# Patient Record
Sex: FEMALE | Race: WHITE | NOT HISPANIC OR LATINO
[De-identification: names, ages, dates, MRNs, and addresses within clinical notes are randomized per-mention and may not be internally consistent; named-entity substitution may affect disease eponyms.]

---

## 2017-01-05 ENCOUNTER — APPOINTMENT (OUTPATIENT)
Dept: ORTHOPEDIC SURGERY | Facility: CLINIC | Age: 32
End: 2017-01-05

## 2017-01-09 ENCOUNTER — APPOINTMENT (OUTPATIENT)
Dept: DERMATOLOGY | Facility: CLINIC | Age: 32
End: 2017-01-09

## 2017-01-09 ENCOUNTER — APPOINTMENT (OUTPATIENT)
Dept: ENDOCRINOLOGY | Facility: CLINIC | Age: 32
End: 2017-01-09

## 2017-01-09 VITALS
DIASTOLIC BLOOD PRESSURE: 78 MMHG | SYSTOLIC BLOOD PRESSURE: 128 MMHG | BODY MASS INDEX: 41.02 KG/M2 | HEIGHT: 71 IN | WEIGHT: 293 LBS

## 2017-01-09 DIAGNOSIS — L21.9 SEBORRHEIC DERMATITIS, UNSPECIFIED: ICD-10-CM

## 2017-01-09 DIAGNOSIS — F17.200 NICOTINE DEPENDENCE, UNSPECIFIED, UNCOMPLICATED: ICD-10-CM

## 2017-01-09 DIAGNOSIS — Z87.898 PERSONAL HISTORY OF OTHER SPECIFIED CONDITIONS: ICD-10-CM

## 2017-01-09 DIAGNOSIS — L81.0 POSTINFLAMMATORY HYPERPIGMENTATION: ICD-10-CM

## 2017-01-09 RX ORDER — KETOCONAZOLE 20.5 MG/ML
2 SHAMPOO, SUSPENSION TOPICAL
Qty: 1 | Refills: 11 | Status: ACTIVE | COMMUNITY
Start: 2017-01-09 | End: 1900-01-01

## 2017-01-10 RX ORDER — FLUOCINOLONE ACETONIDE 0.11 MG/ML
0.01 OIL TOPICAL
Qty: 1 | Refills: 1 | Status: ACTIVE | COMMUNITY
Start: 2017-01-09 | End: 1900-01-01

## 2017-04-10 ENCOUNTER — RESULT REVIEW (OUTPATIENT)
Age: 32
End: 2017-04-10

## 2017-05-09 ENCOUNTER — APPOINTMENT (OUTPATIENT)
Dept: OTOLARYNGOLOGY | Facility: CLINIC | Age: 32
End: 2017-05-09

## 2017-05-09 VITALS
WEIGHT: 293 LBS | SYSTOLIC BLOOD PRESSURE: 146 MMHG | HEART RATE: 104 BPM | DIASTOLIC BLOOD PRESSURE: 102 MMHG | HEIGHT: 71 IN | BODY MASS INDEX: 41.02 KG/M2

## 2017-05-09 DIAGNOSIS — J34.3 HYPERTROPHY OF NASAL TURBINATES: ICD-10-CM

## 2017-05-09 RX ORDER — NORGESTIMATE AND ETHINYL ESTRADIOL 7DAYSX3 LO
0.18/0.215/0.25 KIT ORAL
Qty: 28 | Refills: 0 | Status: ACTIVE | COMMUNITY
Start: 2017-03-30

## 2017-05-09 RX ORDER — CLARITHROMYCIN 500 MG/1
500 TABLET, FILM COATED ORAL
Qty: 20 | Refills: 0 | Status: ACTIVE | COMMUNITY
Start: 2017-01-09

## 2017-05-09 RX ORDER — FLUTICASONE PROPIONATE 50 UG/1
50 SPRAY, METERED NASAL DAILY
Qty: 1 | Refills: 3 | Status: ACTIVE | COMMUNITY
Start: 2017-05-09 | End: 1900-01-01

## 2017-05-09 RX ORDER — METHYLPREDNISOLONE 4 MG/1
4 TABLET ORAL
Qty: 21 | Refills: 0 | Status: ACTIVE | COMMUNITY
Start: 2017-01-09

## 2017-05-09 RX ORDER — CIPROFLOXACIN AND DEXAMETHASONE 3; 1 MG/ML; MG/ML
0.3-0.1 SUSPENSION/ DROPS AURICULAR (OTIC)
Qty: 8 | Refills: 0 | Status: ACTIVE | COMMUNITY
Start: 2017-03-02

## 2017-05-30 ENCOUNTER — APPOINTMENT (OUTPATIENT)
Dept: OTOLARYNGOLOGY | Facility: CLINIC | Age: 32
End: 2017-05-30

## 2017-05-30 VITALS
SYSTOLIC BLOOD PRESSURE: 165 MMHG | WEIGHT: 293 LBS | BODY MASS INDEX: 41.02 KG/M2 | DIASTOLIC BLOOD PRESSURE: 101 MMHG | HEIGHT: 71 IN | HEART RATE: 113 BPM

## 2017-05-30 DIAGNOSIS — H90.0 CONDUCTIVE HEARING LOSS, BILATERAL: ICD-10-CM

## 2017-05-30 DIAGNOSIS — T16.1XXA FOREIGN BODY IN RIGHT EAR, INITIAL ENCOUNTER: ICD-10-CM

## 2017-05-30 DIAGNOSIS — H72.91 UNSPECIFIED PERFORATION OF TYMPANIC MEMBRANE, RIGHT EAR: ICD-10-CM

## 2017-05-30 DIAGNOSIS — H69.83 OTHER SPECIFIED DISORDERS OF EUSTACHIAN TUBE, BILATERAL: ICD-10-CM

## 2017-05-30 DIAGNOSIS — J34.2 DEVIATED NASAL SEPTUM: ICD-10-CM

## 2017-06-12 ENCOUNTER — APPOINTMENT (OUTPATIENT)
Dept: ORTHOPEDIC SURGERY | Facility: CLINIC | Age: 32
End: 2017-06-12

## 2017-06-12 VITALS — HEIGHT: 71 IN | BODY MASS INDEX: 41.02 KG/M2 | WEIGHT: 293 LBS

## 2017-06-12 DIAGNOSIS — M25.371 OTHER INSTABILITY, RIGHT ANKLE: ICD-10-CM

## 2017-06-12 DIAGNOSIS — M77.51 OTHER ENTHESOPATHY OF RT FOOT AND ANKLE: ICD-10-CM

## 2017-06-12 DIAGNOSIS — M24.9 JOINT DERANGEMENT, UNSPECIFIED: ICD-10-CM

## 2017-06-29 ENCOUNTER — APPOINTMENT (OUTPATIENT)
Dept: OTOLARYNGOLOGY | Facility: CLINIC | Age: 32
End: 2017-06-29

## 2017-09-12 ENCOUNTER — APPOINTMENT (OUTPATIENT)
Dept: ORTHOPEDIC SURGERY | Facility: CLINIC | Age: 32
End: 2017-09-12

## 2017-12-03 ENCOUNTER — EMERGENCY (EMERGENCY)
Facility: HOSPITAL | Age: 32
LOS: 1 days | Discharge: HOME | End: 2017-12-03
Attending: EMERGENCY MEDICINE | Admitting: EMERGENCY MEDICINE
Payer: MEDICAID

## 2017-12-03 VITALS
HEART RATE: 98 BPM | RESPIRATION RATE: 16 BRPM | TEMPERATURE: 98 F | OXYGEN SATURATION: 98 % | SYSTOLIC BLOOD PRESSURE: 135 MMHG | DIASTOLIC BLOOD PRESSURE: 87 MMHG

## 2017-12-03 VITALS
WEIGHT: 293 LBS | SYSTOLIC BLOOD PRESSURE: 171 MMHG | HEART RATE: 106 BPM | DIASTOLIC BLOOD PRESSURE: 109 MMHG | RESPIRATION RATE: 18 BRPM | TEMPERATURE: 98 F | OXYGEN SATURATION: 99 %

## 2017-12-03 LAB
APPEARANCE UR: ABNORMAL
BILIRUB UR-MCNC: NEGATIVE — SIGNIFICANT CHANGE UP
COLOR SPEC: YELLOW — SIGNIFICANT CHANGE UP
DIFF PNL FLD: NEGATIVE — SIGNIFICANT CHANGE UP
EPI CELLS # UR: ABNORMAL /HPF
GLUCOSE UR QL: NEGATIVE — SIGNIFICANT CHANGE UP
HCG UR QL: NEGATIVE — SIGNIFICANT CHANGE UP
KETONES UR-MCNC: NEGATIVE — SIGNIFICANT CHANGE UP
LEUKOCYTE ESTERASE UR-ACNC: ABNORMAL
NITRITE UR-MCNC: NEGATIVE — SIGNIFICANT CHANGE UP
PH UR: 6 — SIGNIFICANT CHANGE UP (ref 5–8)
PROT UR-MCNC: 30 MG/DL
RBC CASTS # UR COMP ASSIST: SIGNIFICANT CHANGE UP /HPF (ref 0–2)
SP GR SPEC: 1.03 — HIGH (ref 1.01–1.02)
UROBILINOGEN FLD QL: NEGATIVE — SIGNIFICANT CHANGE UP
WBC UR QL: SIGNIFICANT CHANGE UP /HPF (ref 0–5)

## 2017-12-03 PROCEDURE — 87491 CHLMYD TRACH DNA AMP PROBE: CPT

## 2017-12-03 PROCEDURE — 87801 DETECT AGNT MULT DNA AMPLI: CPT

## 2017-12-03 PROCEDURE — 81001 URINALYSIS AUTO W/SCOPE: CPT

## 2017-12-03 PROCEDURE — 87591 N.GONORRHOEAE DNA AMP PROB: CPT

## 2017-12-03 PROCEDURE — 81025 URINE PREGNANCY TEST: CPT

## 2017-12-03 PROCEDURE — 99284 EMERGENCY DEPT VISIT MOD MDM: CPT

## 2017-12-03 PROCEDURE — 87086 URINE CULTURE/COLONY COUNT: CPT

## 2017-12-03 PROCEDURE — 99283 EMERGENCY DEPT VISIT LOW MDM: CPT

## 2017-12-03 PROCEDURE — 87563 M. GENITALIUM AMP PROBE: CPT

## 2017-12-03 PROCEDURE — 87798 DETECT AGENT NOS DNA AMP: CPT

## 2017-12-03 RX ORDER — FLUCONAZOLE 150 MG/1
150 TABLET ORAL ONCE
Qty: 0 | Refills: 0 | Status: COMPLETED | OUTPATIENT
Start: 2017-12-03 | End: 2017-12-03

## 2017-12-03 RX ORDER — IBUPROFEN 200 MG
600 TABLET ORAL ONCE
Qty: 0 | Refills: 0 | Status: COMPLETED | OUTPATIENT
Start: 2017-12-03 | End: 2017-12-03

## 2017-12-03 RX ADMIN — Medication 600 MILLIGRAM(S): at 22:18

## 2017-12-03 RX ADMIN — Medication 600 MILLIGRAM(S): at 21:19

## 2017-12-03 RX ADMIN — FLUCONAZOLE 150 MILLIGRAM(S): 150 TABLET ORAL at 22:17

## 2017-12-03 NOTE — ED PROVIDER NOTE - GENITOURINARY, MLM
external genitalia normal, small amount of yeast on speculum, no lesions or bleeding, no CMT, no adnexal tenderness.

## 2017-12-03 NOTE — ED PROVIDER NOTE - OBJECTIVE STATEMENT
32F PMH PCOS p/w pelvic pain.  Started 1 week ago, took tylenol x1 without relief.  Pain is suprapubic, waxing/waning, radiates to vaginal, associated with pressure while urinating.  No associated fever/chills/nausea/vomiting.  No left or right sided pain.  LMP 2 weeks ago, normal.  Was seen at [facility] urgent care and had negative UA.  Takes OCPs.  Sexually active with 2 partners, no hx STIs, tested beginning of this year, initially accepted then declined HIV test.

## 2017-12-03 NOTE — ED PROVIDER NOTE - CARE PLAN
Principal Discharge DX:	Undifferentiated abdominal pain Principal Discharge DX:	Undifferentiated abdominal pain  Instructions for follow-up, activity and diet:	You received fluconazole x1 for yeast infection  Follow up on vaginal swab results in 2 days: call emergency department and ask for administrative resident between hours of 9 and 5  Return to emergency department for worsening pain, fever, vomiting, bleeding, discharge, or if you have any new or changing symptoms or concerns

## 2017-12-03 NOTE — ED PROVIDER NOTE - PLAN OF CARE
You received fluconazole x1 for yeast infection  Follow up on vaginal swab results in 2 days: call emergency department and ask for administrative resident between hours of 9 and 5  Return to emergency department for worsening pain, fever, vomiting, bleeding, discharge, or if you have any new or changing symptoms or concerns

## 2017-12-03 NOTE — ED PROVIDER NOTE - ATTENDING CONTRIBUTION TO CARE
------------ATTENDING NOTE------------   33 yo F c/o one week of constant waxing/waning midline suprapubic/abdominal dull ache, slight worse w/ BM, some harder formed stools, no fevers, no dysuria/frequency, LMP 2 wk ago, awaiting labs/imaging -->  - Soto Gambino MD   ---------------------------------------------------------------------------

## 2017-12-03 NOTE — ED ADULT NURSE NOTE - OBJECTIVE STATEMENT
33 yo female pt presents to ed complaining of pelvic pain for seven days. as per pt "I have had this pain across my lower abd for seven days and nothing is making it better. I had a uti years ago and I thought that this was another uti, but I went to urgent care and they said my urine was clean so they sent me here." lmp two weeks ago. pt denies vaginal bleeding/discharge/abd cramping or pain/blood in urine/increased urgency or frequency during urination/n/v/decreased po intake/diarrhea/fever/chills/diaphoresis/chest pain/sob/numbness/tingling. abd nontender and nondistended. skin warm dry and intact. breath sounds clear and equal bilaterally. pt ambulated to bathroom with steady gait. pt complaining of "pressure when I pee.". pt denies burning during urination.

## 2017-12-04 LAB
C TRACH RRNA SPEC QL NAA+PROBE: SIGNIFICANT CHANGE UP
CULTURE RESULTS: SIGNIFICANT CHANGE UP
N GONORRHOEA RRNA SPEC QL NAA+PROBE: SIGNIFICANT CHANGE UP
SPECIMEN SOURCE: SIGNIFICANT CHANGE UP
SPECIMEN SOURCE: SIGNIFICANT CHANGE UP

## 2017-12-07 LAB
A VAGINAE DNA VAG QL NAA+PROBE: ABNORMAL
BVAB2 DNA VAG QL NAA+PROBE: ABNORMAL
C ALBICANS DNA VAG QL NAA+PROBE: NEGATIVE — SIGNIFICANT CHANGE UP
C GLABRATA DNA VAG QL NAA+PROBE: NEGATIVE — SIGNIFICANT CHANGE UP
C KRUSEI DNA VAG QL NAA+PROBE: NEGATIVE — SIGNIFICANT CHANGE UP
C LUSITANIAE DNA VAG QL NAA+PROBE: NEGATIVE — SIGNIFICANT CHANGE UP
C PARAP DNA VAG QL NAA+PROBE: NEGATIVE — SIGNIFICANT CHANGE UP
C TRACH RRNA SPEC QL NAA+PROBE: NEGATIVE — SIGNIFICANT CHANGE UP
C TROPICLS DNA VAG QL NAA+PROBE: NEGATIVE — SIGNIFICANT CHANGE UP
MEGA1 DNA VAG QL NAA+PROBE: ABNORMAL
N GONORRHOEA RRNA SPEC QL NAA+PROBE: NEGATIVE — SIGNIFICANT CHANGE UP
T VAGINALIS RRNA SPEC QL NAA+PROBE: NEGATIVE — SIGNIFICANT CHANGE UP

## 2017-12-08 NOTE — ED POST DISCHARGE NOTE - OTHER COMMUNICATION
Spoke with patient and informed her of +BV.  She reports that her symptoms have improved.  Would like to have results faxed to her OBGYN before initiating treatment. Will fax results -Alberto Hagen PA-C Spoke with patient and informed her of +BV.  She reports that her symptoms have improved.  Would like to have results faxed to her OBGYN before initiating treatment. Will fax results once patient calls back with fax number. -Alberto Hagen PA-C

## 2017-12-08 NOTE — ED POST DISCHARGE NOTE - ADDITIONAL DOCUMENTATION
Cara PGY3: Spoke to patient again states she is currently asymtomatic and does desire treament at this time. Still does not have fax number, will schedule follow up with GYN and have office staff call to have results faxed -Vivian Barrow PA-C

## 2017-12-08 NOTE — ED POST DISCHARGE NOTE - DETAILS
Cara PGY3: VM left. Patient should be asked if symptoms improved and should likely be started on BV antibiotics given "indeterminant" culture findings.

## 2018-03-01 NOTE — ED PROVIDER NOTE - GENITOURINARY [-], MLM
Anesthesia ROS/Med Hx        Pulmonary Review:    Pt. positive for asthma    Cardiovascular Review:    Pt. positive for hypertension    End/Other Review:    Pt. positive for arthritis  Overall Review of Systems Comments:  -  Hx of <K, Asthma, HTN, OA    [id number]    Anesthesia Plan  ASA Status: 2  Anesthesia Type: General  Induction: Intravenous  Reviewed: Lab Results, Pregnancy Test Results, Nursing Notes, Consultations, Patient Summary, Beta Blocker Status, DNR Status, Allergies, Medications, Problem List, Past Med History, Pre-Induction Reassessment and NPO Status  The proposed anesthetic plan, including its risks and benefits, have been discussed with the Patient - along with the risks and benefits of alternatives.  Questions were encouraged and answered and the patient and/or representative agrees to proceed.  Blood Products: Not Anticipated      Physical Exam  Mallampati: I  TM Distance: >3 FB  Neck ROM: Full  cardiovascular exam normal  pulmonary exam normal  abdominal exam normal  dental exam normal  partials      
no hematuria/no difficulty urinating

## 2018-03-19 ENCOUNTER — RESULT REVIEW (OUTPATIENT)
Age: 33
End: 2018-03-19

## 2018-03-31 ENCOUNTER — EMERGENCY (EMERGENCY)
Facility: HOSPITAL | Age: 33
LOS: 1 days | End: 2018-03-31
Attending: EMERGENCY MEDICINE
Payer: COMMERCIAL

## 2018-03-31 VITALS
OXYGEN SATURATION: 99 % | TEMPERATURE: 99 F | DIASTOLIC BLOOD PRESSURE: 93 MMHG | HEART RATE: 94 BPM | SYSTOLIC BLOOD PRESSURE: 139 MMHG | RESPIRATION RATE: 18 BRPM

## 2018-03-31 VITALS
DIASTOLIC BLOOD PRESSURE: 116 MMHG | SYSTOLIC BLOOD PRESSURE: 173 MMHG | HEART RATE: 104 BPM | TEMPERATURE: 98 F | OXYGEN SATURATION: 99 % | RESPIRATION RATE: 18 BRPM

## 2018-03-31 DIAGNOSIS — N90.89 OTHER SPECIFIED NONINFLAMMATORY DISORDERS OF VULVA AND PERINEUM: ICD-10-CM

## 2018-03-31 LAB
ALBUMIN SERPL ELPH-MCNC: 4.1 G/DL — SIGNIFICANT CHANGE UP (ref 3.3–5)
ALP SERPL-CCNC: 86 U/L — SIGNIFICANT CHANGE UP (ref 40–120)
ALT FLD-CCNC: 19 U/L RC — SIGNIFICANT CHANGE UP (ref 10–45)
ANION GAP SERPL CALC-SCNC: 14 MMOL/L — SIGNIFICANT CHANGE UP (ref 5–17)
APPEARANCE UR: ABNORMAL
APTT BLD: 30.7 SEC — SIGNIFICANT CHANGE UP (ref 27.5–37.4)
AST SERPL-CCNC: 20 U/L — SIGNIFICANT CHANGE UP (ref 10–40)
BASOPHILS # BLD AUTO: 0.1 K/UL — SIGNIFICANT CHANGE UP (ref 0–0.2)
BASOPHILS NFR BLD AUTO: 0.8 % — SIGNIFICANT CHANGE UP (ref 0–2)
BILIRUB SERPL-MCNC: 0.2 MG/DL — SIGNIFICANT CHANGE UP (ref 0.2–1.2)
BILIRUB UR-MCNC: NEGATIVE — SIGNIFICANT CHANGE UP
BUN SERPL-MCNC: 11 MG/DL — SIGNIFICANT CHANGE UP (ref 7–23)
CALCIUM SERPL-MCNC: 9.5 MG/DL — SIGNIFICANT CHANGE UP (ref 8.4–10.5)
CHLORIDE SERPL-SCNC: 101 MMOL/L — SIGNIFICANT CHANGE UP (ref 96–108)
CO2 SERPL-SCNC: 24 MMOL/L — SIGNIFICANT CHANGE UP (ref 22–31)
COLOR SPEC: YELLOW — SIGNIFICANT CHANGE UP
CREAT SERPL-MCNC: 0.92 MG/DL — SIGNIFICANT CHANGE UP (ref 0.5–1.3)
DIFF PNL FLD: NEGATIVE — SIGNIFICANT CHANGE UP
EOSINOPHIL # BLD AUTO: 0.1 K/UL — SIGNIFICANT CHANGE UP (ref 0–0.5)
EOSINOPHIL NFR BLD AUTO: 1.9 % — SIGNIFICANT CHANGE UP (ref 0–6)
EPI CELLS # UR: SIGNIFICANT CHANGE UP /HPF
GLUCOSE SERPL-MCNC: 101 MG/DL — HIGH (ref 70–99)
GLUCOSE UR QL: NEGATIVE — SIGNIFICANT CHANGE UP
HCG SERPL QL: NEGATIVE — SIGNIFICANT CHANGE UP
HCT VFR BLD CALC: 41.3 % — SIGNIFICANT CHANGE UP (ref 34.5–45)
HGB BLD-MCNC: 13.9 G/DL — SIGNIFICANT CHANGE UP (ref 11.5–15.5)
INR BLD: 0.97 RATIO — SIGNIFICANT CHANGE UP (ref 0.88–1.16)
KETONES UR-MCNC: NEGATIVE — SIGNIFICANT CHANGE UP
LACTATE BLDV-MCNC: 2 MMOL/L — SIGNIFICANT CHANGE UP (ref 0.7–2)
LEUKOCYTE ESTERASE UR-ACNC: ABNORMAL
LYMPHOCYTES # BLD AUTO: 2.7 K/UL — SIGNIFICANT CHANGE UP (ref 1–3.3)
LYMPHOCYTES # BLD AUTO: 35.5 % — SIGNIFICANT CHANGE UP (ref 13–44)
MCHC RBC-ENTMCNC: 30 PG — SIGNIFICANT CHANGE UP (ref 27–34)
MCHC RBC-ENTMCNC: 33.7 GM/DL — SIGNIFICANT CHANGE UP (ref 32–36)
MCV RBC AUTO: 89.1 FL — SIGNIFICANT CHANGE UP (ref 80–100)
MONOCYTES # BLD AUTO: 0.4 K/UL — SIGNIFICANT CHANGE UP (ref 0–0.9)
MONOCYTES NFR BLD AUTO: 5.2 % — SIGNIFICANT CHANGE UP (ref 2–14)
NEUTROPHILS # BLD AUTO: 4.3 K/UL — SIGNIFICANT CHANGE UP (ref 1.8–7.4)
NEUTROPHILS NFR BLD AUTO: 56.7 % — SIGNIFICANT CHANGE UP (ref 43–77)
NITRITE UR-MCNC: NEGATIVE — SIGNIFICANT CHANGE UP
PH UR: 6 — SIGNIFICANT CHANGE UP (ref 5–8)
PLATELET # BLD AUTO: 240 K/UL — SIGNIFICANT CHANGE UP (ref 150–400)
POTASSIUM SERPL-MCNC: 4.2 MMOL/L — SIGNIFICANT CHANGE UP (ref 3.5–5.3)
POTASSIUM SERPL-SCNC: 4.2 MMOL/L — SIGNIFICANT CHANGE UP (ref 3.5–5.3)
PROT SERPL-MCNC: 7.9 G/DL — SIGNIFICANT CHANGE UP (ref 6–8.3)
PROT UR-MCNC: SIGNIFICANT CHANGE UP
PROTHROM AB SERPL-ACNC: 10.5 SEC — SIGNIFICANT CHANGE UP (ref 9.8–12.7)
RBC # BLD: 4.64 M/UL — SIGNIFICANT CHANGE UP (ref 3.8–5.2)
RBC # FLD: 12.1 % — SIGNIFICANT CHANGE UP (ref 10.3–14.5)
RBC CASTS # UR COMP ASSIST: SIGNIFICANT CHANGE UP /HPF (ref 0–2)
SODIUM SERPL-SCNC: 139 MMOL/L — SIGNIFICANT CHANGE UP (ref 135–145)
SP GR SPEC: 1.03 — HIGH (ref 1.01–1.02)
UROBILINOGEN FLD QL: NEGATIVE — SIGNIFICANT CHANGE UP
WBC # BLD: 7.6 K/UL — SIGNIFICANT CHANGE UP (ref 3.8–10.5)
WBC # FLD AUTO: 7.6 K/UL — SIGNIFICANT CHANGE UP (ref 3.8–10.5)
WBC UR QL: ABNORMAL /HPF (ref 0–5)

## 2018-03-31 PROCEDURE — 85027 COMPLETE CBC AUTOMATED: CPT

## 2018-03-31 PROCEDURE — 85610 PROTHROMBIN TIME: CPT

## 2018-03-31 PROCEDURE — 80053 COMPREHEN METABOLIC PANEL: CPT

## 2018-03-31 PROCEDURE — 87529 HSV DNA AMP PROBE: CPT

## 2018-03-31 PROCEDURE — 83605 ASSAY OF LACTIC ACID: CPT

## 2018-03-31 PROCEDURE — 87798 DETECT AGENT NOS DNA AMP: CPT

## 2018-03-31 PROCEDURE — 99284 EMERGENCY DEPT VISIT MOD MDM: CPT

## 2018-03-31 PROCEDURE — 84703 CHORIONIC GONADOTROPIN ASSAY: CPT

## 2018-03-31 PROCEDURE — 81001 URINALYSIS AUTO W/SCOPE: CPT

## 2018-03-31 PROCEDURE — 85730 THROMBOPLASTIN TIME PARTIAL: CPT

## 2018-03-31 RX ORDER — VALACYCLOVIR 500 MG/1
1000 TABLET, FILM COATED ORAL ONCE
Qty: 0 | Refills: 0 | Status: COMPLETED | OUTPATIENT
Start: 2018-03-31 | End: 2018-03-31

## 2018-03-31 RX ORDER — LIDOCAINE 4 G/100G
1 CREAM TOPICAL ONCE
Qty: 0 | Refills: 0 | Status: COMPLETED | OUTPATIENT
Start: 2018-03-31 | End: 2018-03-31

## 2018-03-31 RX ORDER — VALACYCLOVIR 500 MG/1
1 TABLET, FILM COATED ORAL
Qty: 19 | Refills: 0 | OUTPATIENT
Start: 2018-03-31 | End: 2018-04-09

## 2018-03-31 RX ORDER — FLUCONAZOLE 150 MG/1
150 TABLET ORAL ONCE
Qty: 0 | Refills: 0 | Status: COMPLETED | OUTPATIENT
Start: 2018-03-31 | End: 2018-03-31

## 2018-03-31 RX ADMIN — LIDOCAINE 1 APPLICATION(S): 4 CREAM TOPICAL at 20:16

## 2018-03-31 RX ADMIN — VALACYCLOVIR 1000 MILLIGRAM(S): 500 TABLET, FILM COATED ORAL at 20:34

## 2018-03-31 RX ADMIN — FLUCONAZOLE 150 MILLIGRAM(S): 150 TABLET ORAL at 20:16

## 2018-03-31 NOTE — ED PROVIDER NOTE - OBJECTIVE STATEMENT
31 yo f with pmhx of polycystic ovary disease presents with painful perirectal abscess. Pt was seen by OBGYN 4 days ago, who diagnosed her with bacterial vaginosis and gave metronidazole with no relief of symptoms. Pt states the bump is still there and is the size of a nail. Denies fever, chills, n/v, dysuria. LNMP was a month ago. 31 yo f with pmhx of polycystic ovary disease presents with painful perirectal abscess. Pt was seen by OBGYN 4 days ago, who diagnosed her with bacterial vaginosis and gave metronidazole with no relief of symptoms. Pt states the bump is still there and is the size of a nail. She denies fever, chills, n/v, dysuria. LNMP was a month ago. 33 yo f with pmhx of polycystic ovary disease presents with painful perirectal abscess x4 days. Pt was seen by OBGYN 2 days ago, who diagnosed her with bacterial vaginosis and gave metronidazole with no relief of symptoms. Pt states the bump is still there and is the size of a nail. She denies fever, chills, n/v, dysuria. LNMP was a month ago. 31 yo f with pmhx of polycystic ovary disease presents with painful perivaginal swelling x4 days. Pt was seen by OBGYN 2 days ago, who diagnosed her with bacterial vaginosis and gave metronidazole with no relief of symptoms. Pt says there is a pump just at the base of her vagina just above her rectum. Pt states the bump is still there and is the size of a nail, is solid, exquisitely painful. They did not think it was an abscess 2d ago. SHe has never had similar symptoms. It has not gotten worse since then but has not gotten better. She denies fever, chills, n/v, dysuria. LNMP was a month ago.

## 2018-03-31 NOTE — ED ADULT TRIAGE NOTE - CHIEF COMPLAINT QUOTE
"perirectal abscess" x 4 days- redness and swelling. Per patient seen by OBGYN 2 days ago, given metronidazole cream without relief for "bacterial vaginosis".

## 2018-03-31 NOTE — ED ADULT NURSE REASSESSMENT NOTE - NS ED NURSE REASSESS COMMENT FT1
Received report this PM from Benedicto Amaro RN. PT observed resting comfortably in bed. Pt denies any needs at this time. Awaiting urinalysis. Plan of care reviewed with pt. Pt educated on call bell system and provided call bell. Safety and comfort maintained. Received report this PM from Benedicto Amaro RN. PT observed resting comfortably in bed. Pt denies any needs at this time. Awaiting urinalysis. Plan of care reviewed with pt. Pt educated on call bell system and provided call bell. Safety and comfort maintained.  ED Adult nurse assessment not completed by previous RN. RN SM assessment below:   33 YO  female with PMH PCOS via walk in presenting with complaints of vaginal discomfort. Pt reports vulvular lesion, described as burning pain, accompanied with white vaginal discharge, that has been worsening over the last four days. Pt reports pain is worsened when urinating. Pt denies chest pain, shortness of breath, visual disturbances, numbness/tingling, fever, chills, diaphoresis, headache, nausea, vomiting, constipation, or diarrhea.  Pt Axox4, gross neuro intact, PERRL mm. Lungs clear throughout bilateral. S1S2 heard. Abdomen soft, non-tender, non-distended. Skin warm, dry, and intact. Safety and comfort measures maintained.

## 2018-03-31 NOTE — ED PROVIDER NOTE - MEDICAL DECISION MAKING DETAILS
Pt with cottage cheese like discharge. Discussed with OB-GYN that this may be evolving herpes lesion vs other ulceration. GYN says that they will come down and assess. If they think it's herpes will treat with valtrex.

## 2018-03-31 NOTE — ED PROVIDER NOTE - PLAN OF CARE
You were seen and evaluated in the emergency department for vaginal discomfort. You were found to have an ulcerated lesion. You were given a topical numbing agent you may apply every 2-4 hours as needed. You may take up to 400mg of ibuprofen (Motrin, Advil) every 6 hours as needed for pain. Please take ibuprofen with food if possible to prevent stomach upset. You may also take up to 1000mg of acetaminophen (Tylenol) every 6 hours as needed for pain. It is ok to mix these medications with each other. Do not mix them with other pain medications such as naproxen (Alieve) or asprin unless specifically instructed by your doctor. You were treated for a yeast infection. If you still have thick discharge in 3 days, you may take the second dose of medication you have at home. Given you have a positive blood test for herpes, we will treat for a possible herpes infection as a cause of this vaginal lesion. We sent a swab to confirm this diagnosis but as your lesion was unroofed, this is less sensitive that it normally is. You will need to take valtrex (valacylovir) twice daily for 10 days. Please follow up with your gynecologist in the next week. Please return for worsening pain, fevers, spreading rash or lesions, or other worsening or concerning new symptoms. Please read the attached information sheets. probable genital herpes

## 2018-03-31 NOTE — CONSULT NOTE ADULT - SUBJECTIVE AND OBJECTIVE BOX
HPI:    32y G_P_, LMP      presents with       OBHx:  GynHx:   PMH:  PSH:  All:  Meds:   SocialHx:     Vital Signs Last 24 Hrs  T(C): 36.7 (31 Mar 2018 19:30), Max: 36.7 (31 Mar 2018 19:30)  T(F): 98 (31 Mar 2018 19:30), Max: 98 (31 Mar 2018 19:30)  HR: 109 (31 Mar 2018 19:30) (104 - 109)  BP: 143/82 (31 Mar 2018 19:30) (143/82 - 173/116)  BP(mean): --  RR: 20 (31 Mar 2018 19:30) (18 - 20)  SpO2: 100% (31 Mar 2018 19:30) (99% - 100%)    PE:  Gen: Comfortable, NAD  CV: RRR  Pulm: CTAB  Abd: Soft, NT  Ext: No edema or tenderness bilaterally  Spec Exam:    LABS:                        13.9   7.6   )-----------( 240      ( 31 Mar 2018 18:54 )             41.3     03-31    139  |  101  |  11  ----------------------------<  101<H>  4.2   |  24  |  0.92    Ca    9.5      31 Mar 2018 18:54    TPro  7.9  /  Alb  4.1  /  TBili  0.2  /  DBili  x   /  AST  20  /  ALT  19  /  AlkPhos  86  03-31    PT/INR - ( 31 Mar 2018 18:54 )   PT: 10.5 sec;   INR: 0.97 ratio         PTT - ( 31 Mar 2018 18:54 )  PTT:30.7 sec      Pregnancy Test Routine, Serum (03.31.18 @ 18:54)    Serum Pregnancy: Negative HPI:    32y  LMP 1 month ago presents with c/o vulvar pain and vaginal discharge. Pt had intercourse 5 days ago (not new partner), developed vulvar "rash" 4 days ago associated w/ vulvar burning, pain, and white vaginal discharge. She was seen by GYN ([facility]) 4 days ago and was diagnosed w/ BV and has been using Metrogel. However, pt reports worsening vulvar pain since then and pain when urine hits area. Reports that she initially had dontae of "bumps" that has developed into lesions. Denies blisters. Denies fevers, chills, CP, SOB, N/V.  Pt has been under a lot of stress at work. Sexually active, uses condoms and OCPs. Pt is very anxious.      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, +HPV on pap, hx of ovarian cyst in past; denies fibroids, endometriosis, other STDs  PMH: none  PSH: D&C x 1  All: PCN (rash)  Meds: OCPs  SocialHx: social EtOH (rare); denies smoking and drug use  FamHx: HTN, DM    Vital Signs Last 24 Hrs  T(C): 36.7 (31 Mar 2018 19:30), Max: 36.7 (31 Mar 2018 19:30)  T(F): 98 (31 Mar 2018 19:30), Max: 98 (31 Mar 2018 19:30)  HR: 109 (31 Mar 2018 19:30) (104 - 109)  BP: 143/82 (31 Mar 2018 19:30) (143/82 - 173/116)  BP(mean): --  RR: 20 (31 Mar 2018 19:30) (18 - 20)  SpO2: 100% (31 Mar 2018 19:30) (99% - 100%)    PE:  Gen: Comfortable, NAD  Abd: Soft, NT  Pelvic exam: posterior fourchette w/ unroofed ulcerative lesions, viral swab performed; speculum exam shows thick, clumpy, white vaginal discharge; normal cervix; no CMT, no adnexal tenderness    LABS:                        13.9   7.6   )-----------( 240      ( 31 Mar 2018 18:54 )             41.3         139  |  101  |  11  ----------------------------<  101<H>  4.2   |  24  |  0.92    Ca    9.5      31 Mar 2018 18:54    TPro  7.9  /  Alb  4.1  /  TBili  0.2  /  DBili  x   /  AST  20  /  ALT  19  /  AlkPhos  86      PT/INR - ( 31 Mar 2018 18:54 )   PT: 10.5 sec;   INR: 0.97 ratio         PTT - ( 31 Mar 2018 18:54 )  PTT:30.7 sec      Pregnancy Test Routine, Serum (18 @ 18:54)    Serum Pregnancy: Negative

## 2018-03-31 NOTE — ED PROVIDER NOTE - PROGRESS NOTE DETAILS
seen by GYN who is not sure of source, they have collected herpes swab recommend treatment for herpes empirically. otherwise agree with management. will f/u with outpatient gynecologist.

## 2018-03-31 NOTE — CONSULT NOTE ADULT - ATTENDING COMMENTS
Pt counseled regarding HSV and safe sex. Recommend first outbreak dosing of valtrex, 1000mg bid x 10 days. pt to complete metrogel and may take diflucan with one refill if sx persist. [facility]

## 2018-03-31 NOTE — ED PROVIDER NOTE - NS ED ROS FT
CONST: no fevers, no chills  EYES: no pain, no visual disturbances  ENT: no sore throat, no epistaxis, no rhinorrhea, no hearing changes  CV: no chest pain, no palpitations, no orthopnea, no extremity pain or swelling  RESP: no shortness of breath, no cough, no sputum, no pleurisy, no wheezing  ABD: no nausea, no vomiting  : perirectal abscess, no dysuria  MSK: no back pain, no neck pain, no extremity pain  NEURO: no headache, no sensory disturbances, no focal weakness, no dizziness  HEME: no easy bleeding or bruising  SKIN: no diaphoresis, no rash CONST: no fevers, no chills  EYES: no pain, no visual disturbances  ENT: no sore throat, no epistaxis, no rhinorrhea, no hearing changes  CV: no chest pain, no palpitations, no orthopnea, no extremity pain or swelling  RESP: no shortness of breath, no cough, no sputum, no pleurisy, no wheezing  ABD: no nausea, no vomiting  : see HPI, no dysuria  MSK: no back pain, no neck pain, no extremity pain  NEURO: no headache, no sensory disturbances, no focal weakness, no dizziness  HEME: no easy bleeding or bruising  SKIN: no diaphoresis, no rash

## 2018-03-31 NOTE — ED ADULT NURSE REASSESSMENT NOTE - NS ED NURSE REASSESS COMMENT FT1
RN chaperoned pelvic exam performed by MD Doyle. Pt denies any complaints at this time. OBGYN to be consulted to examine.

## 2018-03-31 NOTE — ED PROVIDER NOTE - CARE PLAN
Principal Discharge DX:	Vaginal ulceration  Assessment and plan of treatment:	You were seen and evaluated in the emergency department for vaginal discomfort. You were found to have an ulcerated lesion. You were given a topical numbing agent you may apply every 2-4 hours as needed. You may take up to 400mg of ibuprofen (Motrin, Advil) every 6 hours as needed for pain. Please take ibuprofen with food if possible to prevent stomach upset. You may also take up to 1000mg of acetaminophen (Tylenol) every 6 hours as needed for pain. It is ok to mix these medications with each other. Do not mix them with other pain medications such as naproxen (Alieve) or asprin unless specifically instructed by your doctor. You were treated for a yeast infection. If you still have thick discharge in 3 days, you may take the second dose of medication you have at home. Given you have a positive blood test for herpes, we will treat for a possible herpes infection as a cause of this vaginal lesion. We sent a swab to confirm this diagnosis but as your lesion was unroofed, this is less sensitive that it normally is. You will need to take valtrex (valacylovir) twice daily for 10 days. Please follow up with your gynecologist in the next week. Please return for worsening pain, fevers, spreading rash or lesions, or other worsening or concerning new symptoms. Please read the attached information sheets.  Secondary Diagnosis:	Yeast vaginitis Principal Discharge DX:	Vaginal ulceration  Goal:	probable genital herpes  Assessment and plan of treatment:	You were seen and evaluated in the emergency department for vaginal discomfort. You were found to have an ulcerated lesion. You were given a topical numbing agent you may apply every 2-4 hours as needed. You may take up to 400mg of ibuprofen (Motrin, Advil) every 6 hours as needed for pain. Please take ibuprofen with food if possible to prevent stomach upset. You may also take up to 1000mg of acetaminophen (Tylenol) every 6 hours as needed for pain. It is ok to mix these medications with each other. Do not mix them with other pain medications such as naproxen (Alieve) or asprin unless specifically instructed by your doctor. You were treated for a yeast infection. If you still have thick discharge in 3 days, you may take the second dose of medication you have at home. Given you have a positive blood test for herpes, we will treat for a possible herpes infection as a cause of this vaginal lesion. We sent a swab to confirm this diagnosis but as your lesion was unroofed, this is less sensitive that it normally is. You will need to take valtrex (valacylovir) twice daily for 10 days. Please follow up with your gynecologist in the next week. Please return for worsening pain, fevers, spreading rash or lesions, or other worsening or concerning new symptoms. Please read the attached information sheets.  Secondary Diagnosis:	Yeast vaginitis

## 2018-03-31 NOTE — CONSULT NOTE ADULT - PROBLEM SELECTOR RECOMMENDATION 9
-vaginal discharge c/w candidiasis, recommend treatment w/ Diflucan  -vulvar lesion herpes outbreak vs. irritation 2/2 candidiasis; given hx of seropositive HSV1/HSV2, would treat w/ Valtrex; viral swab performed on lesions, will f/u   -continue metrogel for BV  -f/u with GYN outpatient    d/w Dr. Ramez Shirley MD PGY4 #[id number]

## 2018-03-31 NOTE — ED PROVIDER NOTE - PHYSICAL EXAMINATION
VITALS: reviewed  GEN: NAD, A & O x 4. Obese and hirsute.  HEAD/EYES: NCAT, PERRL, EOMI, anicteric sclerae, no conjunctival pallor  ENT: mucus membranes moist, oropharynx WNL, trachea midline, no JVD  RESP: lungs CTA with equal breath sounds bilaterally, chest wall nontender and atraumatic  CV: heart with reg rhythm S1, S2, no murmur; distal pulses intact and symmetric bilaterally  ABDOMEN: normoactive bowel sounds, soft, nondistended, nontender, no palpable masses  : no CVAT  MSK: extremities atraumatic and nontender, no edema, no assymetry. the back is without midline or lateral tenderness, there is no spinal deformity or stepoff and the back is ranged painlessly. the neck has no midline tenderness, deformity, or stepoff, and is ranged painlessly.  SKIN: warm, dry, no rash, no bruising, no cyanosis. color appropriate for ethnicity  NEURO: alert, mentating appropriately, no facial asymmetry. gross sensation, motor, coordination are intact  PSYCH: Affect appropriate VITALS: reviewed  GEN: NAD, A & O x 4. Obese and hirsute.  HEAD/EYES: NCAT, PERRL, EOMI, anicteric sclerae, no conjunctival pallor  ENT: mucus membranes moist, oropharynx WNL, trachea midline, no JVD  RESP: lungs CTA with equal breath sounds bilaterally, chest wall nontender and atraumatic  CV: heart with reg rhythm S1, S2, no murmur; distal pulses intact and symmetric bilaterally  ABDOMEN: normoactive bowel sounds, soft, nondistended, nontender, no palpable masses  : Thick clear cottage cheese like vaginal discharge. At site of complaint 1 cm irregularly shaped ulceration. No induration, no vesicles, no palpable lymphadenopathy. Nurse was chaperone.   MSK: extremities atraumatic and nontender, no edema, no assymetry. the back is without midline or lateral tenderness, there is no spinal deformity or stepoff and the back is ranged painlessly. the neck has no midline tenderness, deformity, or stepoff, and is ranged painlessly.  SKIN: warm, dry, no rash, no bruising, no cyanosis. color appropriate for ethnicity  NEURO: alert, mentating appropriately, no facial asymmetry. gross sensation, motor, coordination are intact  PSYCH: Affect appropriate VITALS: reviewed  GEN: NAD, A & O x 4. Obese and hirsute.  HEAD/EYES: NCAT, PERRL, EOMI, anicteric sclerae, no conjunctival pallor  ENT: mucus membranes moist, oropharynx WNL, trachea midline, no JVD  RESP: lungs CTA with equal breath sounds bilaterally, chest wall nontender and atraumatic  CV: heart with reg rhythm S1, S2, no murmur; distal pulses intact and symmetric bilaterally  ABDOMEN: normoactive bowel sounds, soft, nondistended, nontender, no palpable masses  : Thick clear cottage cheese like vaginal discharge in vagina. No CMT, At site of complaint 1 cm irregularly shaped ulceration appearance c/w herpes sore. No induration, no vesicles, no palpable lymphadenopathy. Nurse BLAZE Reno was chaperone.   SKIN: warm, dry, no rash, no bruising, no cyanosis. color appropriate for ethnicity, no other lesions than as above  NEURO: alert, mentating appropriately, no facial asymmetry. gross sensation, motor, coordination are intact  PSYCH: Affect appropriate

## 2018-04-01 LAB
HSV+VZV DNA SPEC QL NAA+PROBE: ABNORMAL
SPECIMEN SOURCE: SIGNIFICANT CHANGE UP

## 2018-04-01 NOTE — ED POST DISCHARGE NOTE - OTHER COMMUNICATION
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 Pt empirically tx'ed in ED but has not yet picked up prescription for Valacyclovir. Rx appears submitted successfully in [employer] system. Confirmed her pharmacy and encouraged her to pick rx up and complete course as instructed. Advised on safe sex practices/informing partner and confirmed f/u scheduled next week. Pt understands. 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 Pt empirically tx'ed in ED w/ valacyclovir but has not yet picked up prescription for Valacyclovir. Rx appears submitted successfully in [employer] system. Confirmed her pharmacy and encouraged her to pick rx up and complete course as instructed. Advised on safe sex practices/informing partner and confirmed f/u scheduled next week. Pt understands. - ALEISHA LockettC

## 2018-04-08 ENCOUNTER — EMERGENCY (EMERGENCY)
Facility: HOSPITAL | Age: 33
LOS: 1 days | Discharge: HOME | End: 2018-04-08
Attending: EMERGENCY MEDICINE
Payer: COMMERCIAL

## 2018-04-08 VITALS
SYSTOLIC BLOOD PRESSURE: 153 MMHG | HEIGHT: 71 IN | DIASTOLIC BLOOD PRESSURE: 89 MMHG | TEMPERATURE: 98 F | HEART RATE: 108 BPM | RESPIRATION RATE: 18 BRPM | WEIGHT: 293 LBS | OXYGEN SATURATION: 99 %

## 2018-04-08 PROCEDURE — 99284 EMERGENCY DEPT VISIT MOD MDM: CPT

## 2018-04-08 RX ORDER — FLUCONAZOLE 150 MG/1
150 TABLET ORAL ONCE
Qty: 0 | Refills: 0 | Status: COMPLETED | OUTPATIENT
Start: 2018-04-08 | End: 2018-04-08

## 2018-04-08 RX ORDER — VALACYCLOVIR 500 MG/1
1000 TABLET, FILM COATED ORAL ONCE
Qty: 0 | Refills: 0 | Status: COMPLETED | OUTPATIENT
Start: 2018-04-08 | End: 2018-04-08

## 2018-04-08 RX ORDER — VALGANCICLOVIR 450 MG/1
1000 TABLET, FILM COATED ORAL ONCE
Qty: 0 | Refills: 0 | Status: DISCONTINUED | OUTPATIENT
Start: 2018-04-08 | End: 2018-04-08

## 2018-04-08 RX ORDER — VALACYCLOVIR 500 MG/1
1 TABLET, FILM COATED ORAL
Qty: 20 | Refills: 0 | OUTPATIENT
Start: 2018-04-08 | End: 2018-04-17

## 2018-04-08 NOTE — ED PROVIDER NOTE - CARE PLAN
Principal Discharge DX:	Candidiasis  Assessment and plan of treatment:	Thank you for visiting our Emergency Department, it has been a pleasure taking part in your healthcare.    Your discharge diagnosis is: candida    Please take all discharge medications as indicated below:  valacyclovir 1000mg two times a day  Please follow up with your PMD within x48 hours.  Please follow up with OBGYN within x48 hours.  Return precautions to the Emergency Department include but are not limited to: unrelenting nausea, vomiting, fever, chills, chest pain, shortness of breath, dizziness, chest or abdominal pain, worsening back pain, syncope, blood in urine or stool, headache that doesn't resolve, numbness or tingling, loss of sensation, loss of motor function, or any other concerning symptoms.

## 2018-04-08 NOTE — ED ADULT TRIAGE NOTE - CHIEF COMPLAINT QUOTE
seen here 1-2 weeks ago for bump on vulva; treated for yeast infection; finished  med; comes back for no improvement in condition

## 2018-04-08 NOTE — ED PROVIDER NOTE - PLAN OF CARE
Thank you for visiting our Emergency Department, it has been a pleasure taking part in your healthcare.    Your discharge diagnosis is: candida    Please take all discharge medications as indicated below:  valacyclovir 1000mg two times a day  Please follow up with your PMD within x48 hours.  Please follow up with OBGYN within x48 hours.  Return precautions to the Emergency Department include but are not limited to: unrelenting nausea, vomiting, fever, chills, chest pain, shortness of breath, dizziness, chest or abdominal pain, worsening back pain, syncope, blood in urine or stool, headache that doesn't resolve, numbness or tingling, loss of sensation, loss of motor function, or any other concerning symptoms.

## 2018-04-08 NOTE — ED PROVIDER NOTE - OBJECTIVE STATEMENT
Pt is a 32F no pmh presenting with a cc of persisting vaginal discharge x8 days a/w x1 painful ulcer inferior vaginal canal, pt reports was seen by outpt obgyn last week for similar cc was told had BV given flagyl, instructed to present to ED for persisting or worsening sx, last Friday pt seen at [facility] ED for worsening sx, was told not BV, dx'd w/ candidiasis and HSV, given diflucan and valacyclovir reports compliance with all medications, now representing for persisting white, cottage cheese discharge, painful ulcer not resolving. tried suppository OTC for candida, w/o relief. no new sexual encounters, HIV testing x3 weeks ago negative. notes mild burning when urine passes over ulcer, otherwise no dysuria, hematuria, pyuria, no new pelvic/abdominal pain. Denies n/v/f/c/cp/sob. Denies headache, syncope, lightheadedness, dizziness. Denies chest palpitations, abdominal pain. Denies hematochezia, BRBPR, tarry stools, diarrhea, constipation.

## 2018-04-08 NOTE — ED PROVIDER NOTE - MEDICAL DECISION MAKING DETAILS
32F presents with a cc of worsening vaginal discharge x8 days a/w x1 painful ulcer base of vaginal meatus, seen in [facility] ED x1 week ago for similar cc told had candidiasis and hsv given valacyclovir and diflucan w/o relief, reports compliance with meds, no const. sx, no new sexual encounters, on exam vss gu exam w/ thick white cottage cheese discharge w/ x1 ulcer at base of vaginal meatus, likely persisting candidiasis, discussed with OBGYN will give diflucan and retreat with valacyclovir, will sent GC/CT swab, pt declined hiv testing negative x3 weeks ago w/ no new partners, no adnexa ttp, low concern for TOA, will give pain control, reassess likely d/c w/ pmd f/u, strict return precautions. 32F presents with a cc of worsening vaginal discharge x8 days a/w x1 painful ulcer base of vaginal meatus, seen in [facility] ED x1 week ago for similar cc told had candidiasis and hsv given valacyclovir and diflucan w/o relief, reports compliance with meds, no const. sx, no new sexual encounters, on exam vss gu exam w/ thick white cottage cheese discharge w/ x1 ulcer at base of vaginal meatus, likely persisting candidiasis, discussed with OBGYN will give diflucan and retreat with valacyclovir, will sent GC/CT swab, pt declined hiv testing negative x3 weeks ago w/ no new partners, no adnexa ttp, low concern for TOA, will give pain control, reassess likely d/c w/ pmd f/u, strict return precautions.    MICAELA Hammonds MD: Pt is a 31 y/o female with recent dx vaginal HSV infection and candida, otherwise healthy,  who p/w c/o persistant vaginal discharge x8 days a/w x1 painful ulcer to inferior aspect of opening to her vaginal canal.  Pt reports was seen by outpt obgyn last week for similar cc was told had BV given flagyl, instructed to present to ED for persisting or worsening sx. She returned 3 days ago to our ED for worsening sx, was told she does not have BV was dx'd w/ candidiasis and HSV, given diflucan and valacyclovir (reports compliance with all medications), was told to d/c her metrogel, which she did. Pt now p/w con't white, cottage cheese like vaginal discharge, painful ulcer not resolving. Pt tried suppository OTC for candida, w/o relief. No new sexual encounters, HIV testing x3 weeks ago negative. Notes mild burning when urine passes over ulcer. Denies other dysuria, hematuria, pyuria, no new pelvic/abdominal pain. Denies n/v/f/c/cp/sob. Exam with sub-centimeter shallow erythematous ulcer to inferior aspect of opening of vaginal androitus x 1, +cottage-cheese appearing vaginal d/c, no purulent d/c, normal appearing cervix, no CMT and adnexal ttp. Discussed case with OBGYN, they recommend add'l course of tx for candida and HSV and outpt f/u to discuss further treatment

## 2018-04-08 NOTE — ED ADULT NURSE NOTE - OBJECTIVE STATEMENT
31 y/o F presents ambulatory to ED c/o persistent vaginal discharge x 8 days also with a painful ulcer to inferior vaginal canal. Pt reports she was seen by outpatient OBGYN last week for same sx and was told she had Bacterial vaginosis and given flagyl, pt reports she was told to present to ED for persisting or worsening sx. Pt reports last Friday she was seen at [facility] ED for worsening sx and was told it is not BV, dx'd w/ candidiasis and HSV, given diflucan and valacyclovir, reports she has been compliant with all medications. Pt now here c/o persistent white, "cottage cheese-like" discharge and that the painful ulcer is not resolving. Pt tried using a suppository OTC for candida with no relief. Pt reports HIV testing 3 weeks ago was negative. Pt denies pelvic/abdominal pain, n/v/f/c. Friend at bedside, safety maintained.

## 2018-04-09 LAB
C TRACH RRNA SPEC QL NAA+PROBE: SIGNIFICANT CHANGE UP
N GONORRHOEA RRNA SPEC QL NAA+PROBE: SIGNIFICANT CHANGE UP
SPECIMEN SOURCE: SIGNIFICANT CHANGE UP

## 2018-04-09 PROCEDURE — 99284 EMERGENCY DEPT VISIT MOD MDM: CPT

## 2018-04-09 RX ADMIN — VALACYCLOVIR 1000 MILLIGRAM(S): 500 TABLET, FILM COATED ORAL at 00:04

## 2018-04-09 RX ADMIN — FLUCONAZOLE 150 MILLIGRAM(S): 150 TABLET ORAL at 00:04

## 2018-04-26 ENCOUNTER — EMERGENCY (EMERGENCY)
Facility: HOSPITAL | Age: 33
LOS: 1 days | Discharge: HOME | End: 2018-04-26
Attending: EMERGENCY MEDICINE
Payer: COMMERCIAL

## 2018-04-26 VITALS
TEMPERATURE: 98 F | RESPIRATION RATE: 18 BRPM | HEART RATE: 93 BPM | DIASTOLIC BLOOD PRESSURE: 93 MMHG | SYSTOLIC BLOOD PRESSURE: 139 MMHG | OXYGEN SATURATION: 95 %

## 2018-04-26 VITALS
HEART RATE: 85 BPM | DIASTOLIC BLOOD PRESSURE: 81 MMHG | RESPIRATION RATE: 18 BRPM | TEMPERATURE: 98 F | SYSTOLIC BLOOD PRESSURE: 115 MMHG | OXYGEN SATURATION: 100 %

## 2018-04-26 LAB
ALBUMIN SERPL ELPH-MCNC: 3.9 G/DL — SIGNIFICANT CHANGE UP (ref 3.3–5)
ALP SERPL-CCNC: 87 U/L — SIGNIFICANT CHANGE UP (ref 40–120)
ALT FLD-CCNC: 17 U/L — SIGNIFICANT CHANGE UP (ref 10–45)
ANION GAP SERPL CALC-SCNC: 14 MMOL/L — SIGNIFICANT CHANGE UP (ref 5–17)
AST SERPL-CCNC: 34 U/L — SIGNIFICANT CHANGE UP (ref 10–40)
BASOPHILS # BLD AUTO: 0.1 K/UL — SIGNIFICANT CHANGE UP (ref 0–0.2)
BASOPHILS NFR BLD AUTO: 0.7 % — SIGNIFICANT CHANGE UP (ref 0–2)
BILIRUB SERPL-MCNC: 0.5 MG/DL — SIGNIFICANT CHANGE UP (ref 0.2–1.2)
BUN SERPL-MCNC: 13 MG/DL — SIGNIFICANT CHANGE UP (ref 7–23)
CALCIUM SERPL-MCNC: 9.7 MG/DL — SIGNIFICANT CHANGE UP (ref 8.4–10.5)
CHLORIDE SERPL-SCNC: 100 MMOL/L — SIGNIFICANT CHANGE UP (ref 96–108)
CO2 SERPL-SCNC: 23 MMOL/L — SIGNIFICANT CHANGE UP (ref 22–31)
CREAT SERPL-MCNC: 0.74 MG/DL — SIGNIFICANT CHANGE UP (ref 0.5–1.3)
EOSINOPHIL # BLD AUTO: 0.2 K/UL — SIGNIFICANT CHANGE UP (ref 0–0.5)
EOSINOPHIL NFR BLD AUTO: 1.7 % — SIGNIFICANT CHANGE UP (ref 0–6)
GAS PNL BLDV: SIGNIFICANT CHANGE UP
GLUCOSE SERPL-MCNC: 97 MG/DL — SIGNIFICANT CHANGE UP (ref 70–99)
HCG SERPL-ACNC: <2 MIU/ML — LOW (ref 5–24)
HCT VFR BLD CALC: 41 % — SIGNIFICANT CHANGE UP (ref 34.5–45)
HGB BLD-MCNC: 13.8 G/DL — SIGNIFICANT CHANGE UP (ref 11.5–15.5)
LYMPHOCYTES # BLD AUTO: 2.6 K/UL — SIGNIFICANT CHANGE UP (ref 1–3.3)
LYMPHOCYTES # BLD AUTO: 27.3 % — SIGNIFICANT CHANGE UP (ref 13–44)
MCHC RBC-ENTMCNC: 29.6 PG — SIGNIFICANT CHANGE UP (ref 27–34)
MCHC RBC-ENTMCNC: 33.7 GM/DL — SIGNIFICANT CHANGE UP (ref 32–36)
MCV RBC AUTO: 87.8 FL — SIGNIFICANT CHANGE UP (ref 80–100)
MONOCYTES # BLD AUTO: 0.4 K/UL — SIGNIFICANT CHANGE UP (ref 0–0.9)
MONOCYTES NFR BLD AUTO: 4.4 % — SIGNIFICANT CHANGE UP (ref 2–14)
NEUTROPHILS # BLD AUTO: 6.4 K/UL — SIGNIFICANT CHANGE UP (ref 1.8–7.4)
NEUTROPHILS NFR BLD AUTO: 66 % — SIGNIFICANT CHANGE UP (ref 43–77)
PLATELET # BLD AUTO: 159 K/UL — SIGNIFICANT CHANGE UP (ref 150–400)
POTASSIUM SERPL-MCNC: 5.4 MMOL/L — HIGH (ref 3.5–5.3)
POTASSIUM SERPL-SCNC: 5.4 MMOL/L — HIGH (ref 3.5–5.3)
PROT SERPL-MCNC: 8.1 G/DL — SIGNIFICANT CHANGE UP (ref 6–8.3)
RBC # BLD: 4.67 M/UL — SIGNIFICANT CHANGE UP (ref 3.8–5.2)
RBC # FLD: 12 % — SIGNIFICANT CHANGE UP (ref 10.3–14.5)
SODIUM SERPL-SCNC: 137 MMOL/L — SIGNIFICANT CHANGE UP (ref 135–145)
WBC # BLD: 9.7 K/UL — SIGNIFICANT CHANGE UP (ref 3.8–10.5)
WBC # FLD AUTO: 9.7 K/UL — SIGNIFICANT CHANGE UP (ref 3.8–10.5)

## 2018-04-26 PROCEDURE — 85014 HEMATOCRIT: CPT

## 2018-04-26 PROCEDURE — 82565 ASSAY OF CREATININE: CPT

## 2018-04-26 PROCEDURE — 83605 ASSAY OF LACTIC ACID: CPT

## 2018-04-26 PROCEDURE — 99284 EMERGENCY DEPT VISIT MOD MDM: CPT | Mod: 25

## 2018-04-26 PROCEDURE — 96375 TX/PRO/DX INJ NEW DRUG ADDON: CPT | Mod: XU

## 2018-04-26 PROCEDURE — 84295 ASSAY OF SERUM SODIUM: CPT

## 2018-04-26 PROCEDURE — 85027 COMPLETE CBC AUTOMATED: CPT

## 2018-04-26 PROCEDURE — 84702 CHORIONIC GONADOTROPIN TEST: CPT

## 2018-04-26 PROCEDURE — 82803 BLOOD GASES ANY COMBINATION: CPT

## 2018-04-26 PROCEDURE — 70491 CT SOFT TISSUE NECK W/DYE: CPT | Mod: 26

## 2018-04-26 PROCEDURE — 80053 COMPREHEN METABOLIC PANEL: CPT

## 2018-04-26 PROCEDURE — 82330 ASSAY OF CALCIUM: CPT

## 2018-04-26 PROCEDURE — 70491 CT SOFT TISSUE NECK W/DYE: CPT

## 2018-04-26 PROCEDURE — 82435 ASSAY OF BLOOD CHLORIDE: CPT

## 2018-04-26 PROCEDURE — 84132 ASSAY OF SERUM POTASSIUM: CPT

## 2018-04-26 PROCEDURE — 82947 ASSAY GLUCOSE BLOOD QUANT: CPT

## 2018-04-26 PROCEDURE — 96374 THER/PROPH/DIAG INJ IV PUSH: CPT | Mod: XU

## 2018-04-26 RX ORDER — ACETAMINOPHEN 500 MG
1000 TABLET ORAL ONCE
Qty: 0 | Refills: 0 | Status: COMPLETED | OUTPATIENT
Start: 2018-04-26 | End: 2018-04-26

## 2018-04-26 RX ORDER — DEXAMETHASONE 0.5 MG/5ML
10 ELIXIR ORAL ONCE
Qty: 0 | Refills: 0 | Status: COMPLETED | OUTPATIENT
Start: 2018-04-26 | End: 2018-04-26

## 2018-04-26 RX ADMIN — Medication 1000 MILLIGRAM(S): at 07:04

## 2018-04-26 RX ADMIN — Medication 400 MILLIGRAM(S): at 06:40

## 2018-04-26 RX ADMIN — Medication 100 MILLIGRAM(S): at 07:04

## 2018-04-26 RX ADMIN — Medication 10 MILLIGRAM(S): at 08:44

## 2018-04-26 NOTE — ED PROVIDER NOTE - PROGRESS NOTE DETAILS
toni - pt endosred to me at signout do abscess or drainable collectio n on ct -- feeling better toelrating po will getr decadron in ed and dc on clinda fu w ent

## 2018-04-26 NOTE — ED PROVIDER NOTE - ATTENDING CONTRIBUTION TO CARE
I have examined and evaluated this patient with the above resident or PA, and agree with the documented clinical history, exam and plan.   Briefly: 31yo F with h/o PCOS, recently treated for strep pharyngitis, presentign with throat pain, change in voice.  On exam in NAD, no drooling/stridor; mildly enlarged b/l erythematous tonsils, no uvular deviation.  Concern for possible RPA; CT soft tissue neck obtained.  No drainable collection; will dc with clindamycin and ENT f/u.

## 2018-04-26 NOTE — ED ADULT NURSE NOTE - CHPI ED SYMPTOMS NEG
no decreased eating/drinking/no numbness/no chills/no weakness/no nausea/no fever/no vomiting/no tingling/no dizziness

## 2018-04-26 NOTE — ED ADULT NURSE REASSESSMENT NOTE - NS ED NURSE REASSESS COMMENT FT1
Received report from Tabitha Serna RN. Pt is A&Ox3, vitals stable. Pt reports a decrease in pain. Pain is currently 7/10 on pain scale. Will continue to monitor pt.

## 2018-04-26 NOTE — ED PROVIDER NOTE - MEDICAL DECISION MAKING DETAILS
31yo female with right-sided facial/neck swelling, h/o strep pharyngitis, concerning for RPA, will check CT neck, labs, give tylenol, clindamycin, reassess. Julieth Munoz DO

## 2018-04-26 NOTE — ED PROVIDER NOTE - OBJECTIVE STATEMENT
31yo female PMH PCOS presenting with right-sided throat pain radiating to right jaw and right ear, odynophagia, and facial swelling. Patient was diagnosed with strep throat last week and was treated with azithromycin, finished course on Friday. This morning, patient noticed that the right side of her face was swollen and came to ED. No fevers or chills. No cough, no nasal congestion. Patient states that she took some Motrin 1 hour prior to ED arrival without relief. 31yo female PMH PCOS presenting with right-sided throat pain radiating to right jaw and right ear, odynophagia, and facial swelling. Patient was diagnosed with strep throat last week and was treated with azithromycin, finished course on Friday. This morning, patient noticed that the right side of her face was swollen and came to ED. No fevers or chills. No cough, no nasal congestion. Patient states that she took some Motrin 1 hour prior to ED arrival without relief.    PMD: Chasity Brambila

## 2018-04-26 NOTE — ED PROVIDER NOTE - PHYSICAL EXAMINATION
Gen: NAD  Head: NCAT  HEENT: Normal voice, +cervical and submandibular lymphadenopathy on right, bilateral tonsillar hypertrophy that is symmetrical  MSK: No edema, no visible deformities, full range of motion in all 4 extremities  Neuro: No focal neurologic deficits  Skin: No rash   Psych: normal affect Gen: NAD  Head: NCAT  HEENT: muffled voice, +cervical and submandibular lymphadenopathy on right, bilateral tonsillar hypertrophy that is symmetrical  MSK: No edema, no visible deformities, full range of motion in all 4 extremities  Neuro: No focal neurologic deficits  Skin: No rash   Psych: normal affect

## 2018-04-26 NOTE — ED ADULT NURSE NOTE - OBJECTIVE STATEMENT
32 year old female came into the ER via walk in with c/o throat pain and swelling to left side of neck. Pt says she was diagnosed with strep throat 2 weeks ago, and finished her course of antibiotics, but the pain is still there. Upon arrival to the ER, coarse voice noted but airway is in tact and patent. Swelling noted to left side of neck, not tender upon palpation. No cough noted. No c/o SOB, difficulty swallowing, CP, N/V/D, abd pain, back pain, fever or chills at this time. Comfort and safety maintained. 32 year old female came into the ER via walk in with c/o throat pain and swelling to right side of neck. Pt says she was diagnosed with strep throat 2 weeks ago, and finished her course of antibiotics, but the pain is still there. Upon arrival to the ER, coarse voice noted but airway is in tact and patent. Swelling noted to right side of neck, not tender upon palpation. No cough noted. No c/o SOB, difficulty swallowing, CP, N/V/D, abd pain, back pain, fever or chills at this time. Comfort and safety maintained.

## 2018-04-26 NOTE — ED ADULT TRIAGE NOTE - CHIEF COMPLAINT QUOTE
R tonsillar pain   treated for strep throat last week with z-saturnino, last dose taken on Friday  denies fevers, chills

## 2018-06-01 NOTE — ED ADULT NURSE NOTE - CHPI ED SYMPTOMS NEG
no blood in stool/no nausea/no fever/no dysuria/no diarrhea/no hematuria/no chills/no vomiting/no abdominal distension/no burning urination [id number]

## 2018-06-29 ENCOUNTER — EMERGENCY (EMERGENCY)
Facility: HOSPITAL | Age: 33
LOS: 1 days | Discharge: HOME | End: 2018-06-29
Attending: EMERGENCY MEDICINE
Payer: COMMERCIAL

## 2018-06-29 VITALS
SYSTOLIC BLOOD PRESSURE: 138 MMHG | HEART RATE: 115 BPM | TEMPERATURE: 98 F | OXYGEN SATURATION: 99 % | RESPIRATION RATE: 20 BRPM | DIASTOLIC BLOOD PRESSURE: 84 MMHG

## 2018-06-29 PROCEDURE — 99284 EMERGENCY DEPT VISIT MOD MDM: CPT | Mod: 25

## 2018-06-29 RX ORDER — SODIUM CHLORIDE 9 MG/ML
1000 INJECTION INTRAMUSCULAR; INTRAVENOUS; SUBCUTANEOUS ONCE
Qty: 0 | Refills: 0 | Status: COMPLETED | OUTPATIENT
Start: 2018-06-29 | End: 2018-06-29

## 2018-06-29 NOTE — ED ADULT NURSE NOTE - OBJECTIVE STATEMENT
32y female with hx of migraines presents to the ER c/o headache. pt is alert and oriented x 4 and speaking coherently. pt states three days ago she had an onset of right sided headache, specifically in right temporal. pt states the pain is 10/10. tried multiple medications with out relief. pt denies cp, sob, n/v/d, fevers. pt states pain is worsened when looking at lights. pt states she started to experience migraines 4 months ago, but this is the worst migraine yet. pt appears uncomfortable. vss. pending md hargrove. will reassess.

## 2018-06-30 VITALS
DIASTOLIC BLOOD PRESSURE: 85 MMHG | HEART RATE: 100 BPM | OXYGEN SATURATION: 98 % | RESPIRATION RATE: 16 BRPM | TEMPERATURE: 98 F | SYSTOLIC BLOOD PRESSURE: 131 MMHG

## 2018-06-30 LAB
ALBUMIN SERPL ELPH-MCNC: 3.8 G/DL — SIGNIFICANT CHANGE UP (ref 3.3–5)
ALP SERPL-CCNC: 103 U/L — SIGNIFICANT CHANGE UP (ref 40–120)
ALT FLD-CCNC: 19 U/L — SIGNIFICANT CHANGE UP (ref 10–45)
ANION GAP SERPL CALC-SCNC: 12 MMOL/L — SIGNIFICANT CHANGE UP (ref 5–17)
APPEARANCE UR: CLEAR — SIGNIFICANT CHANGE UP
APTT BLD: 31 SEC — SIGNIFICANT CHANGE UP (ref 27.5–37.4)
AST SERPL-CCNC: 17 U/L — SIGNIFICANT CHANGE UP (ref 10–40)
BASOPHILS # BLD AUTO: 0 K/UL — SIGNIFICANT CHANGE UP (ref 0–0.2)
BASOPHILS NFR BLD AUTO: 0.2 % — SIGNIFICANT CHANGE UP (ref 0–2)
BILIRUB SERPL-MCNC: 0.3 MG/DL — SIGNIFICANT CHANGE UP (ref 0.2–1.2)
BILIRUB UR-MCNC: NEGATIVE — SIGNIFICANT CHANGE UP
BUN SERPL-MCNC: 12 MG/DL — SIGNIFICANT CHANGE UP (ref 7–23)
CALCIUM SERPL-MCNC: 9.6 MG/DL — SIGNIFICANT CHANGE UP (ref 8.4–10.5)
CHLORIDE SERPL-SCNC: 101 MMOL/L — SIGNIFICANT CHANGE UP (ref 96–108)
CO2 SERPL-SCNC: 24 MMOL/L — SIGNIFICANT CHANGE UP (ref 22–31)
COLOR SPEC: YELLOW — SIGNIFICANT CHANGE UP
CREAT SERPL-MCNC: 0.83 MG/DL — SIGNIFICANT CHANGE UP (ref 0.5–1.3)
DIFF PNL FLD: NEGATIVE — SIGNIFICANT CHANGE UP
EOSINOPHIL # BLD AUTO: 0.2 K/UL — SIGNIFICANT CHANGE UP (ref 0–0.5)
EOSINOPHIL NFR BLD AUTO: 1.6 % — SIGNIFICANT CHANGE UP (ref 0–6)
EPI CELLS # UR: SIGNIFICANT CHANGE UP /HPF
GLUCOSE SERPL-MCNC: 132 MG/DL — HIGH (ref 70–99)
GLUCOSE UR QL: NEGATIVE — SIGNIFICANT CHANGE UP
HCT VFR BLD CALC: 39.3 % — SIGNIFICANT CHANGE UP (ref 34.5–45)
HGB BLD-MCNC: 13 G/DL — SIGNIFICANT CHANGE UP (ref 11.5–15.5)
INR BLD: 1.08 RATIO — SIGNIFICANT CHANGE UP (ref 0.88–1.16)
KETONES UR-MCNC: NEGATIVE — SIGNIFICANT CHANGE UP
LEUKOCYTE ESTERASE UR-ACNC: ABNORMAL
LYMPHOCYTES # BLD AUTO: 2.2 K/UL — SIGNIFICANT CHANGE UP (ref 1–3.3)
LYMPHOCYTES # BLD AUTO: 20 % — SIGNIFICANT CHANGE UP (ref 13–44)
MCHC RBC-ENTMCNC: 29.1 PG — SIGNIFICANT CHANGE UP (ref 27–34)
MCHC RBC-ENTMCNC: 33.1 GM/DL — SIGNIFICANT CHANGE UP (ref 32–36)
MCV RBC AUTO: 87.9 FL — SIGNIFICANT CHANGE UP (ref 80–100)
MONOCYTES # BLD AUTO: 0.6 K/UL — SIGNIFICANT CHANGE UP (ref 0–0.9)
MONOCYTES NFR BLD AUTO: 5.7 % — SIGNIFICANT CHANGE UP (ref 2–14)
NEUTROPHILS # BLD AUTO: 7.9 K/UL — HIGH (ref 1.8–7.4)
NEUTROPHILS NFR BLD AUTO: 72.4 % — SIGNIFICANT CHANGE UP (ref 43–77)
NITRITE UR-MCNC: NEGATIVE — SIGNIFICANT CHANGE UP
PH UR: 6 — SIGNIFICANT CHANGE UP (ref 5–8)
PLATELET # BLD AUTO: 234 K/UL — SIGNIFICANT CHANGE UP (ref 150–400)
POTASSIUM SERPL-MCNC: 4.2 MMOL/L — SIGNIFICANT CHANGE UP (ref 3.5–5.3)
POTASSIUM SERPL-SCNC: 4.2 MMOL/L — SIGNIFICANT CHANGE UP (ref 3.5–5.3)
PROT SERPL-MCNC: 8.3 G/DL — SIGNIFICANT CHANGE UP (ref 6–8.3)
PROT UR-MCNC: SIGNIFICANT CHANGE UP
PROTHROM AB SERPL-ACNC: 11.7 SEC — SIGNIFICANT CHANGE UP (ref 9.8–12.7)
RBC # BLD: 4.47 M/UL — SIGNIFICANT CHANGE UP (ref 3.8–5.2)
RBC # FLD: 11.7 % — SIGNIFICANT CHANGE UP (ref 10.3–14.5)
RBC CASTS # UR COMP ASSIST: ABNORMAL /HPF (ref 0–2)
SODIUM SERPL-SCNC: 137 MMOL/L — SIGNIFICANT CHANGE UP (ref 135–145)
SP GR SPEC: 1.03 — HIGH (ref 1.01–1.02)
UROBILINOGEN FLD QL: NEGATIVE — SIGNIFICANT CHANGE UP
WBC # BLD: 10.9 K/UL — HIGH (ref 3.8–10.5)
WBC # FLD AUTO: 10.9 K/UL — HIGH (ref 3.8–10.5)
WBC UR QL: SIGNIFICANT CHANGE UP /HPF (ref 0–5)

## 2018-06-30 PROCEDURE — 87086 URINE CULTURE/COLONY COUNT: CPT

## 2018-06-30 PROCEDURE — 81001 URINALYSIS AUTO W/SCOPE: CPT

## 2018-06-30 PROCEDURE — 70450 CT HEAD/BRAIN W/O DYE: CPT | Mod: 26

## 2018-06-30 PROCEDURE — 80053 COMPREHEN METABOLIC PANEL: CPT

## 2018-06-30 PROCEDURE — 85027 COMPLETE CBC AUTOMATED: CPT

## 2018-06-30 PROCEDURE — 85610 PROTHROMBIN TIME: CPT

## 2018-06-30 PROCEDURE — 85730 THROMBOPLASTIN TIME PARTIAL: CPT

## 2018-06-30 PROCEDURE — 70450 CT HEAD/BRAIN W/O DYE: CPT

## 2018-06-30 RX ORDER — METOCLOPRAMIDE HCL 10 MG
10 TABLET ORAL ONCE
Qty: 0 | Refills: 0 | Status: COMPLETED | OUTPATIENT
Start: 2018-06-30 | End: 2018-06-30

## 2018-06-30 RX ORDER — KETOROLAC TROMETHAMINE 30 MG/ML
15 SYRINGE (ML) INJECTION ONCE
Qty: 0 | Refills: 0 | Status: DISCONTINUED | OUTPATIENT
Start: 2018-06-30 | End: 2018-06-30

## 2018-06-30 RX ORDER — ACETAMINOPHEN 500 MG
975 TABLET ORAL ONCE
Qty: 0 | Refills: 0 | Status: COMPLETED | OUTPATIENT
Start: 2018-06-30 | End: 2018-06-30

## 2018-06-30 RX ADMIN — SODIUM CHLORIDE 1000 MILLILITER(S): 9 INJECTION INTRAMUSCULAR; INTRAVENOUS; SUBCUTANEOUS at 00:01

## 2018-06-30 RX ADMIN — Medication 15 MILLIGRAM(S): at 00:50

## 2018-06-30 RX ADMIN — Medication 975 MILLIGRAM(S): at 02:15

## 2018-06-30 RX ADMIN — Medication 10 MILLIGRAM(S): at 00:50

## 2018-06-30 RX ADMIN — Medication 15 MILLIGRAM(S): at 01:50

## 2018-06-30 RX ADMIN — Medication 975 MILLIGRAM(S): at 02:40

## 2018-06-30 NOTE — ED PROVIDER NOTE - CARE PLAN
Principal Discharge DX:	Headache  Assessment and plan of treatment:	Thank you for visiting our Emergency Department, it has been a pleasure taking part in your healthcare.    Your discharge diagnosis is: headache  Please take all discharge medications as indicated below:  Take Motrin/Tylenol for pain as needed, please follow instructions on manufacturers label. If you have any questions please consult a pharmacist or your PMD.  Please follow up with your PMD within x48 hours.  A copy of resulted labs, imaging, and findings have been provided to you.   You have had a detailed discussion with your provider regarding your diagnosis, care management and discharge planning including, but not limited to: return precautions, follow up visits with existing or new providers, new prescriptions and/or medication changes, wound and/or spint/cast care or other care   aspects specific to your diagnosis and treatment. You have been given the opportunity to have your questions answered. At this time you have been deemed stable and fit for discharge.  Return precautions to the Emergency Department include but are not limited to: unrelenting nausea, vomiting, fever, chills, chest pain, shortness of breath, dizziness, chest or abdominal pain, worsening back pain, syncope, blood in urine or stool, headache that doesn't resolve, numbness or tingling, loss of sensation, loss of motor function, or any other concerning symptoms.

## 2018-06-30 NOTE — ED PROVIDER NOTE - OBJECTIVE STATEMENT
32F no PMH presents with a cc of R sided HA x3 days, a/w cough congestion and sore throat, reports pain initially c/w prior sinus infections, which usually respond to motrin and Excedrin, this worsening, HA now worse than prior a/w photophobia, does have prior hx of migraines, this different, R sided and more severe. No rash. Neck stiffness however can fully range neck. Subjective fever and chills. Denies n/v/cp/sob. Denies syncope, Denies chest palpitations, abdominal pain. Denies dysuria, hematuria, hematochezia, BRBPR, tarry stools, diarrhea, constipation.

## 2018-06-30 NOTE — ED PROVIDER NOTE - PROGRESS NOTE DETAILS
Patient evaluation and care begun prior to documentation. Patient was evaluated by me, found in no acute distress, calm, speaking in complete sentences. Patient found with stable vital signs, afebrile, with no nuchal rigidity, photophobia, or meningeal signs. IV pain medication ordered for treatment. Low suspicion for meningitis at this time. Will f/u on results and patient's clinical progression to determine final disposition. I agree with resident assessment and plan. -Dr. Mazin Robles Jacinto PGY1: Pt assessed at beside. Pt resting comfortably, reports marked improvement in headache now 3/10, able to fully range neck, no potophobia, afebrile vss, pt asking to be d/c'd, well appearing tolerating PO will d/c w/ strict return precautions, pmd fu

## 2018-06-30 NOTE — ED PROVIDER NOTE - MEDICAL DECISION MAKING DETAILS
32F no PMH presents with a cc of R sided HA x3 days, a/w cough congestion and sore throat, reports pain initially c/w prior sinus infections, which usually respond to motrin and Excedrin, this worsening, HA now worse than prior a/w photophobia, does have prior hx of migraines, this different, R sided and more severe. exam tachycardic, afebrile, appears uncomfortable, neuro w/o fnd, neck and throat findings as above, low concern for meningitis, can range neck, no rash, no fever, ddx for migraine vs tension HA in setting of viral uri will get labs ivf migraine cocktail reassess 32F no PMH presents with a cc of R sided HA x3 days, a/w cough congestion and sore throat, reports pain initially c/w prior sinus infections, which usually respond to motrin and Excedrin, this worsening, HA now worse than prior a/w photophobia, does have prior hx of migraines, this different, R sided and more severe. exam tachycardic, afebrile, appears uncomfortable, neuro w/o fnd, neck and throat findings as above, low concern for meningitis, can range neck, no rash, no fever, ddx for migraine vs tension HA in setting of viral uri will get labs ivf migraine cocktail reassess    Dr. Robles: Female patient with no past hx of systemic illnesses, brought to ED due to complaints of headache for past 3 days. No weakness, numbness, vision or gait problems. Patient found in no acute distress, calm, speaking in complete sentences. Patient found with stable vital signs, afebrile, with no nuchal rigidity, photophobia, or meningeal signs. IV pain medication ordered for treatment. Low suspicion for meningitis at this time. Will f/u on results and patient's clinical progression to determine final disposition. 32F no PMH presents with a cc of R sided HA x3 days, a/w cough congestion and sore throat, reports pain initially c/w prior sinus infections, which usually respond to motrin and Excedrin, this worsening, HA now worse than prior a/w photophobia, does have prior hx of migraines, this different, R sided and more severe. exam tachycardic, afebrile, appears uncomfortable, neuro w/o fnd, neck and throat findings as above, low concern for meningitis, can range neck, no rash, no fever, ddx for migraine vs tension HA in setting of viral uri will get labs ivf migraine cocktail reassess    Dr. Robles: Female patient with no past hx of systemic illnesses, brought to ED due to complaints of headache for past 3 days. No weakness, numbness, vision or gait problems. Patient found in no acute distress, calm, speaking in complete sentences. Patient found with stable vital signs, afebrile, with no gross neurologic deficits, no nuchal rigidity, photophobia, or meningeal signs. IV pain medication ordered for treatment. Low suspicion for meningitis at this time. Will f/u on results and patient's clinical progression to determine final disposition.

## 2018-06-30 NOTE — ED PROVIDER NOTE - PHYSICAL EXAMINATION
GEN APPEARANCE: WDWN, alert and cooperative, appears uncomfortable and in NAD  HEAD: Atraumatic, normocephalic   EYES: PERRLa, EOMI, vision grossly intact. +photophobia  EARS: Gross hearing intact.   NOSE: No nasal discharge, no external evidence of epistaxis.   THROAT: MMM. R>L tonsillar swelling, no erythremia, no exudates.   NECK: R sided submandibular swelling, R sided tender adenopathy  CV: RRR, S1S2, no c/r/m/g. No cyanosis or pallor. Extremities warm, well perfused. Cap refill <2 seconds. No bruits.   LUNGS: CTAB. No wheezing. No rales. No rhonchi. No diminished breath sounds.   ABDOMEN: Soft, NTND. No guarding or rebound. No masses.   MSK: Spine appears normal, no spine point tenderness. No CVA ttp. No joint erythema or tenderness. Normal muscular development. Pelvis stable.  EXTREMITIES: No peripheral edema. No obvious joint or bony deformity.  NEURO: Alert, follows commands. Weight bearing normal. Speech normal. Sensation and motor normal x4 extremities.   SKIN: Normal color for race, warm, dry and intact. No evidence of rash.  PSYCH: Normal mood and affect.

## 2018-06-30 NOTE — ED PROVIDER NOTE - PLAN OF CARE
Thank you for visiting our Emergency Department, it has been a pleasure taking part in your healthcare.    Your discharge diagnosis is: headache  Please take all discharge medications as indicated below:  Take Motrin/Tylenol for pain as needed, please follow instructions on manufacturers label. If you have any questions please consult a pharmacist or your PMD.  Please follow up with your PMD within x48 hours.  A copy of resulted labs, imaging, and findings have been provided to you.   You have had a detailed discussion with your provider regarding your diagnosis, care management and discharge planning including, but not limited to: return precautions, follow up visits with existing or new providers, new prescriptions and/or medication changes, wound and/or spint/cast care or other care   aspects specific to your diagnosis and treatment. You have been given the opportunity to have your questions answered. At this time you have been deemed stable and fit for discharge.  Return precautions to the Emergency Department include but are not limited to: unrelenting nausea, vomiting, fever, chills, chest pain, shortness of breath, dizziness, chest or abdominal pain, worsening back pain, syncope, blood in urine or stool, headache that doesn't resolve, numbness or tingling, loss of sensation, loss of motor function, or any other concerning symptoms.

## 2018-07-01 LAB
CULTURE RESULTS: SIGNIFICANT CHANGE UP
SPECIMEN SOURCE: SIGNIFICANT CHANGE UP

## 2019-05-28 ENCOUNTER — APPOINTMENT (OUTPATIENT)
Dept: ORTHOPEDIC SURGERY | Facility: CLINIC | Age: 34
End: 2019-05-28
Payer: COMMERCIAL

## 2019-05-28 ENCOUNTER — TRANSCRIPTION ENCOUNTER (OUTPATIENT)
Age: 34
End: 2019-05-28

## 2019-05-28 VITALS
HEIGHT: 71 IN | HEART RATE: 98 BPM | SYSTOLIC BLOOD PRESSURE: 124 MMHG | BODY MASS INDEX: 41.02 KG/M2 | WEIGHT: 293 LBS | DIASTOLIC BLOOD PRESSURE: 84 MMHG

## 2019-05-28 PROCEDURE — 99214 OFFICE O/P EST MOD 30 MIN: CPT

## 2019-05-28 PROCEDURE — 73564 X-RAY EXAM KNEE 4 OR MORE: CPT | Mod: LT

## 2019-05-28 RX ORDER — NORGESTIMATE AND ETHINYL ESTRADIOL 7DAYSX3 28
KIT ORAL
Refills: 0 | Status: ACTIVE | COMMUNITY

## 2019-05-28 RX ORDER — CHLORHEXIDINE GLUCONATE, 0.12% ORAL RINSE 1.2 MG/ML
0.12 SOLUTION DENTAL
Qty: 473 | Refills: 0 | Status: ACTIVE | COMMUNITY
Start: 2019-04-11

## 2019-05-28 RX ORDER — MELOXICAM 15 MG/1
15 TABLET ORAL
Qty: 30 | Refills: 0 | Status: ACTIVE | COMMUNITY
Start: 2019-01-16

## 2019-05-28 NOTE — REVIEW OF SYSTEMS
[Arthralgia] : no arthralgia [Joint Pain] : joint pain [Joint Stiffness] : joint stiffness [Negative] : Heme/Lymph

## 2019-05-28 NOTE — DISCUSSION/SUMMARY
[de-identified] : This patient has left knee pain with positive meniscal signs.  I would like to obtain an MRI of the left knee to evaluate for meniscal tear.  I encouraged the patient to follow-up with me after obtaining the MRI so that we we can review imaging and discuss next steps.  In the meantime I encouraged her to continue with the meloxicam and also to use a neoprene sleeve knee brace.

## 2019-05-28 NOTE — HISTORY OF PRESENT ILLNESS
[de-identified] : This is very nice 33-year-old female experiencing 5 days of acute left knee pain, which is severe in intensity.  She states that she was walking up some steps at school and there was fluid on the ground that she try to avoid and felt a pop in her knee when she went to twist around the step.  She did not fall or hit her head in this action.  Initially she had difficulty bearing weight on the leg but now she is able to ambulate unassisted.  She denies any catching or clicking or locking in the knee but the knee feels like it may give way on her but it has not given way.  Insert neuro she does not use a knee brace.  She does not use a cane or walker.  She takes meloxicam at baseline for her foot which does help.  She has not tried physical therapy for this.  Walking tolerance is slightly reduced.  Activities of daily living are not limited.

## 2019-05-28 NOTE — PHYSICAL EXAM
[de-identified] : Patient is well nourished, well-developed, in no acute distress, with appropriate mood and affect. The patient is oriented to time, place, and person. Respirations are even and unlabored. Gait evaluation does not reveal a limp. There is no inguinal adenopathy. Examination of the contralateral knee shows normal range of motion, strength, no tenderness, and intact skin. The affected limb is well-perfused and showed 2+ dp/pt pulses, without skin lesions, shows a grossly normal motor and sensory examination. Knee motion is painless and the knee moves from 0-130 degrees. The knee is stable within that range-of-motion to AP and ML stress with a 1A Lachman, negative anterior or posterior drawer and no instability to varus or valgus stress. The alignment of the knee is 5 degrees varus. No effusion or crepitus is noted.  Tender to palpation in the medial joint line.  No tenderness to palpation about the lateral joint line, medial or lateral tibial plateau, medial or lateral femoral condyle, medial or lateral patellar facets, superior or inferior pole of the patella. Evie's is positive medially. Muscle strength is normal. Pedal pulses are palpable. Hip examination was negative. [de-identified] : Long standing knee, AP knee, lateral knee, and patellar views of the left knee were ordered and taken in the office and demonstrate no evidence of degenerative joint disease of the knee, fractures, intra-articular pathology.

## 2019-06-02 ENCOUNTER — OUTPATIENT (OUTPATIENT)
Dept: OUTPATIENT SERVICES | Facility: HOSPITAL | Age: 34
LOS: 1 days | End: 2019-06-02
Payer: COMMERCIAL

## 2019-06-02 ENCOUNTER — APPOINTMENT (OUTPATIENT)
Dept: MRI IMAGING | Facility: CLINIC | Age: 34
End: 2019-06-02
Payer: COMMERCIAL

## 2019-06-02 DIAGNOSIS — M25.562 PAIN IN LEFT KNEE: ICD-10-CM

## 2019-06-02 PROCEDURE — 73721 MRI JNT OF LWR EXTRE W/O DYE: CPT | Mod: 26,LT

## 2019-06-02 PROCEDURE — 73721 MRI JNT OF LWR EXTRE W/O DYE: CPT

## 2019-07-02 ENCOUNTER — APPOINTMENT (OUTPATIENT)
Dept: ORTHOPEDIC SURGERY | Facility: CLINIC | Age: 34
End: 2019-07-02
Payer: COMMERCIAL

## 2019-07-02 PROCEDURE — 99214 OFFICE O/P EST MOD 30 MIN: CPT

## 2019-07-02 NOTE — DISCUSSION/SUMMARY
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  An extensive discussion was conducted on the natural history of the disease and the variety of surgical and non-surgical options available to the patient including, but not limited to non-steroidal anti-inflammatory medications, steroid injections, physical therapy, maintenance of ideal body weight, and reduction of activity.  She will continue the knee brace.  Weight loss is recommended.  She will continue with the Mobic.  The patient will schedule an appointment as needed.

## 2019-07-02 NOTE — PHYSICAL EXAM
[de-identified] : Patient is well nourished, well-developed, in no acute distress, with appropriate mood and affect. The patient is oriented to time, place, and person. Respirations are even and unlabored. Gait evaluation does not reveal a limp. There is no inguinal adenopathy. Examination of the contralateral knee shows normal range of motion, strength, no tenderness, and intact skin. The affected limb is well-perfused and showed 2+ dp/pt pulses, without skin lesions, shows a grossly normal motor and sensory examination. Knee motion is painless and the knee moves from 0-130 degrees. The knee is stable within that range-of-motion to AP and ML stress with a 1A Lachman, negative anterior or posterior drawer and no instability to varus or valgus stress. The alignment of the knee is 5 degrees varus. No effusion or crepitus is noted.  Tender to palpation in the medial joint line.  No tenderness to palpation about the lateral joint line, medial or lateral tibial plateau, medial or lateral femoral condyle, medial or lateral patellar facets, superior or inferior pole of the patella. Evie's is negative. Muscle strength is normal. Pedal pulses are palpable. Hip examination was negative. [de-identified] : Long standing knee, AP knee, lateral knee, and patellar views of the left knee were reviewed from the previous visit and demonstrate no evidence of degenerative joint disease of the knee, fractures, intra-articular pathology.\par \par Patient brings with her an MRI of the left knee that demonstrates mild degenerative changes of the medial femoral condyle consistent with chondromalacia but no evidence of meniscal tear.  Cruciates are intact and collaterals are intact.

## 2019-07-02 NOTE — HISTORY OF PRESENT ILLNESS
[de-identified] : This is very nice 33-year-old female experiencing 5 days of acute left knee pain, which is severe in intensity.  She states that she was walking up some steps at school and there was fluid on the ground that she try to avoid and felt a pop in her knee when she went to twist around the step.  She did not fall or hit her head in this action.  Initially she had difficulty bearing weight on the leg but now she is able to ambulate unassisted.  She denies any catching or clicking or locking in the knee but the knee feels like it may give way on her but it has not given way.  Insert neuro she does not use a knee brace.  She does not use a cane or walker.  She has not tried physical therapy for this.  Walking tolerance is slightly reduced.  Activities of daily living are not limited.  Here for a reevaluation.  She is here with her MRI that I had ordered.  Has been mildly helpful.

## 2019-07-25 NOTE — ED PROVIDER NOTE - DATA REVIEWED, MDM
Bed: 09  Expected date:   Expected time:   Means of arrival:   Comments:  anisa  
nurses notes/vital signs

## 2019-11-13 ENCOUNTER — APPOINTMENT (OUTPATIENT)
Dept: ORTHOPEDIC SURGERY | Facility: CLINIC | Age: 34
End: 2019-11-13
Payer: COMMERCIAL

## 2019-11-13 VITALS — HEIGHT: 71 IN | WEIGHT: 293 LBS | BODY MASS INDEX: 41.02 KG/M2

## 2019-11-13 DIAGNOSIS — M25.562 PAIN IN LEFT KNEE: ICD-10-CM

## 2019-11-13 DIAGNOSIS — M22.42 CHONDROMALACIA PATELLAE, LEFT KNEE: ICD-10-CM

## 2019-11-13 PROCEDURE — 99213 OFFICE O/P EST LOW 20 MIN: CPT

## 2019-11-13 NOTE — HISTORY OF PRESENT ILLNESS
[de-identified] : This is very nice 33-year-old female experiencing chronic left knee pain, which is severe in intensity.  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  She states that she was walking up some steps at school and there was fluid on the ground that she try to avoid and felt a pop in her knee when she went to twist around the step.  She is able to ambulate unassisted.  She does not require a cane or walker.  Walking up and down stairs is most painful for her.  Rising from a chair is painful.  She denies any catching or clicking or locking in the knee and the knee is not giving way.  The patient denies any radiation of the pain to the feet and it is not associated with numbness, tingling, or weakness.  She tried a neoprene sleeve knee brace which helped mildly..  She has not tried physical therapy for this.  Walking tolerance is slightly reduced.  Activities of daily living are not limited.  Here for a reevaluation.  Mobic has been mildly helpful.

## 2019-11-13 NOTE — DISCUSSION/SUMMARY
[de-identified] : This patient has left knee pain secondary to mild chondromalacia of the medial femoral condyle of the left knee and patellofemoral joint.  An extensive discussion was conducted on the natural history of the disease and the variety of surgical and non-surgical options available to the patient including, but not limited to non-steroidal anti-inflammatory medications, steroid injections, physical therapy, maintenance of ideal body weight, and reduction of activity.  She will continue the knee brace.  Weight loss is recommended.  She will continue with the Mobic.  Physical therapy was prescribed.  The patient will schedule an appointment as needed.

## 2019-11-13 NOTE — PHYSICAL EXAM
[de-identified] : Patient is well nourished, well-developed, in no acute distress, with appropriate mood and affect. The patient is oriented to time, place, and person. Respirations are even and unlabored. Gait evaluation does not reveal a limp. There is no inguinal adenopathy. Examination of the contralateral knee shows normal range of motion, strength, no tenderness, and intact skin. The affected limb is well-perfused and showed 2+ dp/pt pulses, without skin lesions, shows a grossly normal motor and sensory examination. Knee motion is painless and the knee moves from 0-130 degrees. The knee is stable within that range-of-motion to AP and ML stress with a 1A Lachman, negative anterior or posterior drawer and no instability to varus or valgus stress. The alignment of the knee is 5 degrees varus. No effusion or crepitus is noted.  Tender to palpation in the medial joint line.  Positive patellar grind.  No tenderness to palpation about the lateral joint line, medial or lateral tibial plateau, medial or lateral femoral condyle, medial or lateral patellar facets, superior or inferior pole of the patella. Evie's is negative. Muscle strength is normal. Pedal pulses are palpable. Hip examination was negative.

## 2019-11-20 ENCOUNTER — APPOINTMENT (OUTPATIENT)
Dept: ORTHOPEDIC SURGERY | Facility: CLINIC | Age: 34
End: 2019-11-20

## 2020-08-29 NOTE — ED ADULT NURSE NOTE - CAS EDN DISCHARGE ASSESSMENT
Alert and oriented to person, place and time/Awake/Patient baseline mental status
Initial (On Arrival)

## 2022-07-19 NOTE — REASON FOR VISIT
Impression: Presbyopia: H52.4. Plan: New spectacle RX given. [Initial Visit] : an initial visit for [Back Pain] : back pain

## 2022-07-20 ENCOUNTER — APPOINTMENT (OUTPATIENT)
Dept: ORTHOPEDIC SURGERY | Facility: CLINIC | Age: 37
End: 2022-07-20

## 2022-07-20 VITALS
WEIGHT: 293 LBS | HEART RATE: 103 BPM | SYSTOLIC BLOOD PRESSURE: 171 MMHG | BODY MASS INDEX: 41.02 KG/M2 | DIASTOLIC BLOOD PRESSURE: 99 MMHG | HEIGHT: 71 IN

## 2022-07-20 DIAGNOSIS — M79.10 MYALGIA, UNSPECIFIED SITE: ICD-10-CM

## 2022-07-20 DIAGNOSIS — M60.9 MYOSITIS, UNSPECIFIED: ICD-10-CM

## 2022-07-20 DIAGNOSIS — M54.50 LOW BACK PAIN, UNSPECIFIED: ICD-10-CM

## 2022-07-20 DIAGNOSIS — M51.36 OTHER INTERVERTEBRAL DISC DEGENERATION, LUMBAR REGION: ICD-10-CM

## 2022-07-20 PROCEDURE — 20552 NJX 1/MLT TRIGGER POINT 1/2: CPT

## 2022-07-20 PROCEDURE — 99204 OFFICE O/P NEW MOD 45 MIN: CPT | Mod: 25

## 2022-07-20 NOTE — PHYSICAL EXAM
[Normal] : Gait: normal [Maldonado's Sign] : negative Maldonado's sign [Pronator Drift] : negative pronator drift [SLR] : negative straight leg raise [de-identified] : 5 out of 5 motor strength,sensation is intact and symmetrical full range of motion flexion extension and rotation, no palpatory tenderness full range of motion of hips knees shoulders and elbows (all four extremities), no atrophy, negative straight leg raise, no pathological reflexes, no swelling, normal ambulation, no apparent distress skin intact, no palpable lymph nodes, no upper or lower extremity instability, alert and oriented x 3 and normal mood. Normal finger-to nose test.\par Right SI joint pain. [de-identified] : MRI lumbar\par 7/28/2021\par L3-4 Disc desiccation \par

## 2022-07-20 NOTE — REVIEW OF SYSTEMS
[Joint Pain] : joint pain [Joint Stiffness] : joint stiffness [Negative] : Heme/Lymph [FreeTextEntry9] : lower back pain

## 2022-07-20 NOTE — ADDENDUM
[FreeTextEntry1] : This note was written by Addison Conley on 07/20/2022 acting as scribe for Dr. Humza Howard M.D.\par \par I, Humza Howard MD, have read and attest that all the information, medical decision making and discharge instructions within are true and accurate.

## 2022-07-20 NOTE — DISCUSSION/SUMMARY
[de-identified] : Right sacroiliitis \par Discussed all options. \par Referral for physical therapy. \par I offered an injection after all risks were explained including allergic reaction to an infection under sterile conditions 1 mg of Depo-Medrol and 2 cc of 1% Lidocaine without epinephrine was injected into the painful site. The patient tolerated the procedure well and received significant relief following the injection. \par All options discussed including rest,medicine,home exercise, acupuncture, Chiropractic care, physical therapy, pain management and last resort surgery. All questions were answered, all alternatives discussed and the patient is in complete agreement with the plan. Follow up appointment as instructed. If any issues arise, the patient will call or come in sooner.\par

## 2022-08-08 ENCOUNTER — APPOINTMENT (OUTPATIENT)
Dept: ORTHOPEDIC SURGERY | Facility: CLINIC | Age: 37
End: 2022-08-08

## 2023-10-03 NOTE — HISTORY OF PRESENT ILLNESS
[Stable] : stable [de-identified] : Pt is a 36 year old female who presents to the office today for an initial evaluation of lower back pain, which has been present intermittently for over 1 year; since April 2021. \par Pain started when she was helping her mother (who had Parkinsons) get into the car, and she immediate pulling pain in her low back. \par Pain worsened last week. \par Pain now constant and radiates down to buttocks to the right knee. \par Sitting, changing positions, walking worsen the pain. Aleve helps a little. \par No numbness or tingling. \par No prior PT. No prior SURENDRA. \par No prior chiropractor care. \par MRI done 7/28/21 at [facility]. \par No fever, chills, sweats, nausea/vomiting. No bowel or bladder dysfunction, no recent weight loss or gain. No night pain. This history is in addition to the intake form that I personally reviewed.\par \par \par  36.6